# Patient Record
Sex: FEMALE | Race: BLACK OR AFRICAN AMERICAN | NOT HISPANIC OR LATINO | Employment: FULL TIME | URBAN - METROPOLITAN AREA
[De-identification: names, ages, dates, MRNs, and addresses within clinical notes are randomized per-mention and may not be internally consistent; named-entity substitution may affect disease eponyms.]

---

## 2019-06-24 ENCOUNTER — HOSPITAL ENCOUNTER (EMERGENCY)
Facility: HOSPITAL | Age: 34
Discharge: HOME | End: 2019-06-24
Attending: EMERGENCY MEDICINE | Admitting: EMERGENCY MEDICINE
Payer: COMMERCIAL

## 2019-06-24 VITALS
SYSTOLIC BLOOD PRESSURE: 109 MMHG | DIASTOLIC BLOOD PRESSURE: 55 MMHG | TEMPERATURE: 97.7 F | BODY MASS INDEX: 22.78 KG/M2 | OXYGEN SATURATION: 100 % | RESPIRATION RATE: 18 BRPM | HEART RATE: 92 BPM | HEIGHT: 60 IN | WEIGHT: 116 LBS

## 2019-06-24 DIAGNOSIS — R10.9 ABDOMINAL PAIN: Primary | ICD-10-CM

## 2019-06-24 DIAGNOSIS — R19.7 DIARRHEA: ICD-10-CM

## 2019-06-24 DIAGNOSIS — R11.2 NAUSEA AND VOMITING: ICD-10-CM

## 2019-06-24 LAB
EXT PREG TEST URINE: NEGATIVE
EXT. CONTROL ED NAV: NORMAL

## 2019-06-24 PROCEDURE — 99284 EMERGENCY DEPT VISIT MOD MDM: CPT

## 2019-06-24 PROCEDURE — 81025 URINE PREGNANCY TEST: CPT | Performed by: EMERGENCY MEDICINE

## 2019-06-24 RX ORDER — ONDANSETRON 4 MG/1
4 TABLET, ORALLY DISINTEGRATING ORAL ONCE
Status: COMPLETED | OUTPATIENT
Start: 2019-06-24 | End: 2019-06-24

## 2019-06-24 RX ORDER — DICYCLOMINE HYDROCHLORIDE 10 MG/1
20 CAPSULE ORAL ONCE
Status: COMPLETED | OUTPATIENT
Start: 2019-06-24 | End: 2019-06-24

## 2019-06-24 RX ORDER — PANTOPRAZOLE SODIUM 40 MG/1
40 TABLET, DELAYED RELEASE ORAL ONCE
Status: COMPLETED | OUTPATIENT
Start: 2019-06-24 | End: 2019-06-24

## 2019-06-24 RX ORDER — DICYCLOMINE HCL 20 MG
20 TABLET ORAL 2 TIMES DAILY
Qty: 20 TABLET | Refills: 0 | Status: SHIPPED | OUTPATIENT
Start: 2019-06-24 | End: 2019-07-15

## 2019-06-24 RX ORDER — ONDANSETRON 4 MG/1
4 TABLET, FILM COATED ORAL EVERY 6 HOURS
Qty: 9 TABLET | Refills: 0 | Status: SHIPPED | OUTPATIENT
Start: 2019-06-24 | End: 2019-07-15

## 2019-06-24 RX ORDER — PANTOPRAZOLE SODIUM 20 MG/1
20 TABLET, DELAYED RELEASE ORAL DAILY
Qty: 20 TABLET | Refills: 0 | Status: SHIPPED | OUTPATIENT
Start: 2019-06-24 | End: 2019-07-15

## 2019-06-24 RX ADMIN — PANTOPRAZOLE SODIUM 40 MG: 40 TABLET, DELAYED RELEASE ORAL at 13:49

## 2019-06-24 RX ADMIN — ONDANSETRON 4 MG: 4 TABLET, ORALLY DISINTEGRATING ORAL at 13:49

## 2019-06-24 RX ADMIN — DICYCLOMINE HYDROCHLORIDE 20 MG: 10 CAPSULE ORAL at 13:49

## 2019-07-15 ENCOUNTER — APPOINTMENT (EMERGENCY)
Dept: RADIOLOGY | Facility: HOSPITAL | Age: 34
End: 2019-07-15
Payer: COMMERCIAL

## 2019-07-15 ENCOUNTER — HOSPITAL ENCOUNTER (EMERGENCY)
Facility: HOSPITAL | Age: 34
Discharge: HOME | End: 2019-07-15
Attending: EMERGENCY MEDICINE | Admitting: EMERGENCY MEDICINE
Payer: COMMERCIAL

## 2019-07-15 VITALS
DIASTOLIC BLOOD PRESSURE: 56 MMHG | BODY MASS INDEX: 22.39 KG/M2 | WEIGHT: 114.64 LBS | OXYGEN SATURATION: 96 % | RESPIRATION RATE: 16 BRPM | SYSTOLIC BLOOD PRESSURE: 106 MMHG | TEMPERATURE: 98.5 F | HEART RATE: 74 BPM

## 2019-07-15 DIAGNOSIS — K52.9 COLITIS: ICD-10-CM

## 2019-07-15 DIAGNOSIS — R10.9 ABDOMINAL PAIN: Primary | ICD-10-CM

## 2019-07-15 LAB
ALBUMIN SERPL BCP-MCNC: 3.5 G/DL (ref 3.5–5)
ALP SERPL-CCNC: 49 U/L (ref 46–116)
ALT SERPL W P-5'-P-CCNC: 20 U/L (ref 12–78)
ANION GAP SERPL CALCULATED.3IONS-SCNC: 7 MMOL/L (ref 4–13)
AST SERPL W P-5'-P-CCNC: 17 U/L (ref 5–45)
BASOPHILS # BLD AUTO: 0.04 THOUSANDS/ΜL (ref 0–0.1)
BASOPHILS NFR BLD AUTO: 0 % (ref 0–1)
BILIRUB SERPL-MCNC: 0.3 MG/DL (ref 0.2–1)
BILIRUB UR QL STRIP: NEGATIVE
BUN SERPL-MCNC: 12 MG/DL (ref 5–25)
CALCIUM SERPL-MCNC: 8.2 MG/DL (ref 8.3–10.1)
CHLORIDE SERPL-SCNC: 104 MMOL/L (ref 100–108)
CLARITY UR: CLEAR
CO2 SERPL-SCNC: 27 MMOL/L (ref 21–32)
COLOR UR: ABNORMAL
CREAT SERPL-MCNC: 0.69 MG/DL (ref 0.6–1.3)
EOSINOPHIL # BLD AUTO: 0.11 THOUSAND/ΜL (ref 0–0.61)
EOSINOPHIL NFR BLD AUTO: 1 % (ref 0–6)
ERYTHROCYTE [DISTWIDTH] IN BLOOD BY AUTOMATED COUNT: 12.8 % (ref 11.6–15.1)
EXT PREG TEST URINE: NEGATIVE
EXT. CONTROL ED NAV: NORMAL
GFR SERPL CREATININE-BSD FRML MDRD: 130 ML/MIN/1.73SQ M
GLUCOSE SERPL-MCNC: 101 MG/DL (ref 65–140)
GLUCOSE UR STRIP-MCNC: NEGATIVE MG/DL
HCT VFR BLD AUTO: 37.4 % (ref 34.8–46.1)
HGB BLD-MCNC: 12 G/DL (ref 11.5–15.4)
HGB UR QL STRIP.AUTO: NEGATIVE
IMM GRANULOCYTES # BLD AUTO: 0.03 THOUSAND/UL (ref 0–0.2)
IMM GRANULOCYTES NFR BLD AUTO: 0 % (ref 0–2)
KETONES UR STRIP-MCNC: ABNORMAL MG/DL
LEUKOCYTE ESTERASE UR QL STRIP: NEGATIVE
LIPASE SERPL-CCNC: 119 U/L (ref 73–393)
LYMPHOCYTES # BLD AUTO: 2.32 THOUSANDS/ΜL (ref 0.6–4.47)
LYMPHOCYTES NFR BLD AUTO: 23 % (ref 14–44)
MAGNESIUM SERPL-MCNC: 1.8 MG/DL (ref 1.6–2.6)
MCH RBC QN AUTO: 31.3 PG (ref 26.8–34.3)
MCHC RBC AUTO-ENTMCNC: 32.1 G/DL (ref 31.4–37.4)
MCV RBC AUTO: 97 FL (ref 82–98)
MONOCYTES # BLD AUTO: 0.71 THOUSAND/ΜL (ref 0.17–1.22)
MONOCYTES NFR BLD AUTO: 7 % (ref 4–12)
NEUTROPHILS # BLD AUTO: 7.11 THOUSANDS/ΜL (ref 1.85–7.62)
NEUTS SEG NFR BLD AUTO: 69 % (ref 43–75)
NITRITE UR QL STRIP: NEGATIVE
NRBC BLD AUTO-RTO: 0 /100 WBCS
PH UR STRIP.AUTO: 6 [PH]
PLATELET # BLD AUTO: 248 THOUSANDS/UL (ref 149–390)
PMV BLD AUTO: 10.8 FL (ref 8.9–12.7)
POTASSIUM SERPL-SCNC: 4.2 MMOL/L (ref 3.5–5.3)
PROT SERPL-MCNC: 7.4 G/DL (ref 6.4–8.2)
PROT UR STRIP-MCNC: NEGATIVE MG/DL
RBC # BLD AUTO: 3.84 MILLION/UL (ref 3.81–5.12)
SODIUM SERPL-SCNC: 138 MMOL/L (ref 136–145)
SP GR UR STRIP.AUTO: 1.02 (ref 1–1.03)
UROBILINOGEN UR QL STRIP.AUTO: 0.2 E.U./DL
WBC # BLD AUTO: 10.32 THOUSAND/UL (ref 4.31–10.16)

## 2019-07-15 PROCEDURE — 81003 URINALYSIS AUTO W/O SCOPE: CPT | Performed by: EMERGENCY MEDICINE

## 2019-07-15 PROCEDURE — 36415 COLL VENOUS BLD VENIPUNCTURE: CPT | Performed by: EMERGENCY MEDICINE

## 2019-07-15 PROCEDURE — 81025 URINE PREGNANCY TEST: CPT | Performed by: EMERGENCY MEDICINE

## 2019-07-15 PROCEDURE — 85025 COMPLETE CBC W/AUTO DIFF WBC: CPT | Performed by: EMERGENCY MEDICINE

## 2019-07-15 PROCEDURE — 80053 COMPREHEN METABOLIC PANEL: CPT | Performed by: EMERGENCY MEDICINE

## 2019-07-15 PROCEDURE — 99284 EMERGENCY DEPT VISIT MOD MDM: CPT

## 2019-07-15 PROCEDURE — 83735 ASSAY OF MAGNESIUM: CPT | Performed by: EMERGENCY MEDICINE

## 2019-07-15 PROCEDURE — 96374 THER/PROPH/DIAG INJ IV PUSH: CPT

## 2019-07-15 PROCEDURE — 74177 CT ABD & PELVIS W/CONTRAST: CPT

## 2019-07-15 PROCEDURE — 96361 HYDRATE IV INFUSION ADD-ON: CPT

## 2019-07-15 PROCEDURE — 83690 ASSAY OF LIPASE: CPT | Performed by: EMERGENCY MEDICINE

## 2019-07-15 RX ORDER — CIPROFLOXACIN 500 MG/1
500 TABLET, FILM COATED ORAL 2 TIMES DAILY
Qty: 20 TABLET | Refills: 0 | Status: SHIPPED | OUTPATIENT
Start: 2019-07-15 | End: 2019-07-25

## 2019-07-15 RX ORDER — METRONIDAZOLE 500 MG/1
500 TABLET ORAL EVERY 8 HOURS SCHEDULED
Qty: 30 TABLET | Refills: 0 | Status: SHIPPED | OUTPATIENT
Start: 2019-07-15 | End: 2019-07-25

## 2019-07-15 RX ORDER — METRONIDAZOLE 500 MG/1
500 TABLET ORAL ONCE
Status: COMPLETED | OUTPATIENT
Start: 2019-07-15 | End: 2019-07-15

## 2019-07-15 RX ORDER — KETOROLAC TROMETHAMINE 30 MG/ML
15 INJECTION, SOLUTION INTRAMUSCULAR; INTRAVENOUS ONCE
Status: COMPLETED | OUTPATIENT
Start: 2019-07-15 | End: 2019-07-15

## 2019-07-15 RX ORDER — CIPROFLOXACIN 500 MG/1
500 TABLET, FILM COATED ORAL ONCE
Status: COMPLETED | OUTPATIENT
Start: 2019-07-15 | End: 2019-07-15

## 2019-07-15 RX ORDER — MORPHINE SULFATE 4 MG/ML
4 INJECTION, SOLUTION INTRAMUSCULAR; INTRAVENOUS ONCE
Status: DISCONTINUED | OUTPATIENT
Start: 2019-07-15 | End: 2019-07-15

## 2019-07-15 RX ORDER — ONDANSETRON 4 MG/1
4 TABLET, FILM COATED ORAL EVERY 6 HOURS
Qty: 9 TABLET | Refills: 0 | Status: SHIPPED | OUTPATIENT
Start: 2019-07-15 | End: 2021-05-05

## 2019-07-15 RX ORDER — FLUCONAZOLE 100 MG/1
200 TABLET ORAL ONCE
Status: COMPLETED | OUTPATIENT
Start: 2019-07-15 | End: 2019-07-15

## 2019-07-15 RX ADMIN — KETOROLAC TROMETHAMINE 15 MG: 30 INJECTION, SOLUTION INTRAMUSCULAR at 19:39

## 2019-07-15 RX ADMIN — SODIUM CHLORIDE 1000 ML: 0.9 INJECTION, SOLUTION INTRAVENOUS at 19:38

## 2019-07-15 RX ADMIN — CIPROFLOXACIN HYDROCHLORIDE 500 MG: 500 TABLET, FILM COATED ORAL at 21:50

## 2019-07-15 RX ADMIN — METRONIDAZOLE 500 MG: 500 TABLET, FILM COATED ORAL at 21:50

## 2019-07-15 RX ADMIN — FLUCONAZOLE 200 MG: 100 TABLET ORAL at 21:58

## 2019-07-15 RX ADMIN — IOHEXOL 85 ML: 350 INJECTION, SOLUTION INTRAVENOUS at 20:48

## 2019-07-15 NOTE — ED PROVIDER NOTES
History  Chief Complaint   Patient presents with    Abdominal Pain     patient c/o generalized abdominal pain starting about 3 hours ago  describes as pressure     26 y/o female presents with diffuse intermittent lower abdominal pain started about 3 hours ago, worsening, associated with some bloody bowel movements  Admits to nausea, denies vomiting,fevers,chills,dysuria, urgency or frequency  LMP 2 weeks ago  History of tubal ligation, no other abdominal surgeries  The pain is sharp non radiating, currently 6/10 nothing makes it better or worse  History provided by:  Patient   used: No        None       History reviewed  No pertinent past medical history  Past Surgical History:   Procedure Laterality Date    TUBAL LIGATION         History reviewed  No pertinent family history  I have reviewed and agree with the history as documented  Social History     Tobacco Use    Smoking status: Never Smoker    Smokeless tobacco: Never Used   Substance Use Topics    Alcohol use: Yes     Comment: socially    Drug use: Never        Review of Systems   All other systems reviewed and are negative  Physical Exam  Physical Exam   Constitutional: She is oriented to person, place, and time  She appears well-developed and well-nourished  HENT:   Head: Normocephalic and atraumatic  Eyes: Pupils are equal, round, and reactive to light  EOM are normal    Neck: Normal range of motion  Neck supple  Cardiovascular: Normal rate and regular rhythm  Pulmonary/Chest: Effort normal and breath sounds normal    Abdominal: Soft  Bowel sounds are normal  She exhibits no shifting dullness, no distension, no pulsatile liver, no fluid wave, no abdominal bruit, no ascites, no pulsatile midline mass and no mass  There is no hepatosplenomegaly, splenomegaly or hepatomegaly  There is tenderness   There is no rigidity, no rebound, no guarding, no CVA tenderness, no tenderness at McBurney's point and negative Ríos's sign  No hernia  Hernia confirmed negative in the ventral area, confirmed negative in the right inguinal area and confirmed negative in the left inguinal area  Musculoskeletal: Normal range of motion  Neurological: She is alert and oriented to person, place, and time  Skin: Skin is warm and dry  Psychiatric: She has a normal mood and affect  Nursing note and vitals reviewed        Vital Signs  ED Triage Vitals [07/15/19 1838]   Temperature Pulse Respirations Blood Pressure SpO2   98 5 °F (36 9 °C) 78 18 124/77 100 %      Temp Source Heart Rate Source Patient Position - Orthostatic VS BP Location FiO2 (%)   Tympanic Monitor Sitting Right arm --      Pain Score       Worst Possible Pain           Vitals:    07/15/19 1838 07/15/19 2015 07/15/19 2033   BP: 124/77 110/64 106/56   Pulse: 78 83 74   Patient Position - Orthostatic VS: Sitting Lying Lying         Visual Acuity      ED Medications  Medications   sodium chloride 0 9 % bolus 1,000 mL (0 mL Intravenous Stopped 7/15/19 2047)   ketorolac (TORADOL) injection 15 mg (15 mg Intravenous Given 7/15/19 1939)   iohexol (OMNIPAQUE) 350 MG/ML injection (MULTI-DOSE) 85 mL (85 mL Intravenous Given 7/15/19 2048)   ciprofloxacin (CIPRO) tablet 500 mg (500 mg Oral Given 7/15/19 2150)   metroNIDAZOLE (FLAGYL) tablet 500 mg (500 mg Oral Given 7/15/19 2150)       Diagnostic Studies  Results Reviewed     Procedure Component Value Units Date/Time    Lipase [[id number]]  (Normal) Collected:  07/15/19 1937    Lab Status:  Final result Specimen:  Blood from Arm, Right Updated:  07/15/19 2004     Lipase 119 u/L     Comprehensive metabolic panel [[id number]]  (Abnormal) Collected:  07/15/19 1937    Lab Status:  Final result Specimen:  Blood from Arm, Right Updated:  07/15/19 2004     Sodium 138 mmol/L      Potassium 4 2 mmol/L      Chloride 104 mmol/L      CO2 27 mmol/L      ANION GAP 7 mmol/L      BUN 12 mg/dL      Creatinine 0 69 mg/dL      Glucose 101 mg/dL      Calcium 8 2 mg/dL      AST 17 U/L      ALT 20 U/L      Alkaline Phosphatase 49 U/L      Total Protein 7 4 g/dL      Albumin 3 5 g/dL      Total Bilirubin 0 30 mg/dL      eGFR 130 ml/min/1 73sq m     Narrative:       [place] guidelines for Chronic Kidney Disease (CKD):     Stage 1 with normal or high GFR (GFR > 90 mL/min/1 73 square meters)    Stage 2 Mild CKD (GFR = 60-89 mL/min/1 73 square meters)    Stage 3A Moderate CKD (GFR = 45-59 mL/min/1 73 square meters)    Stage 3B Moderate CKD (GFR = 30-44 mL/min/1 73 square meters)    Stage 4 Severe CKD (GFR = 15-29 mL/min/1 73 square meters)    Stage 5 End Stage CKD (GFR <15 mL/min/1 73 square meters)  Note: GFR calculation is accurate only with a steady state creatinine    Magnesium [[id number]]  (Normal) Collected:  07/15/19 1937    Lab Status:  Final result Specimen:  Blood from Arm, Right Updated:  07/15/19 2004     Magnesium 1 8 mg/dL     UA (URINE) with reflex to Microscopic [[id number]]  (Abnormal) Collected:  07/15/19 1941    Lab Status:  Final result Specimen:  Urine, Clean Catch Updated:  07/15/19 1950     Color, UA Light Yellow     Clarity, UA Clear     Specific Gravity, UA 1 025     pH, UA 6 0     Leukocytes, UA Negative     Nitrite, UA Negative     Protein, UA Negative mg/dl      Glucose, UA Negative mg/dl      Ketones, UA Trace mg/dl      Urobilinogen, UA 0 2 E U /dl      Bilirubin, UA Negative     Blood, UA Negative    CBC and differential [[id number]]  (Abnormal) Collected:  07/15/19 1937    Lab Status:  Final result Specimen:  Blood from Arm, Right Updated:  07/15/19 1949     WBC 10 32 Thousand/uL      RBC 3 84 Million/uL      Hemoglobin 12 0 g/dL      Hematocrit 37 4 %      MCV 97 fL      MCH 31 3 pg      MCHC 32 1 g/dL      RDW 12 8 %      MPV 10 8 fL      Platelets 412 Thousands/uL      nRBC 0 /100 WBCs      Neutrophils Relative 69 %      Immat GRANS % 0 %      Lymphocytes Relative 23 %      Monocytes Relative 7 % Eosinophils Relative 1 %      Basophils Relative 0 %      Neutrophils Absolute 7 11 Thousands/µL      Immature Grans Absolute 0 03 Thousand/uL      Lymphocytes Absolute 2 32 Thousands/µL      Monocytes Absolute 0 71 Thousand/µL      Eosinophils Absolute 0 11 Thousand/µL      Basophils Absolute 0 04 Thousands/µL     POCT pregnancy, urine [[id number]]  (Normal) Resulted:  07/15/19 1945    Lab Status:  Final result Updated:  07/15/19 1945     EXT PREG TEST UR (Ref: Negative) negative     Control valid                 CT abdomen pelvis with contrast   Final Result by Carly Ford DO (07/15 2107)      Stool-like material is noted within the terminal ileum which may represent slow transit of bowel  The majority of the large bowel is decompressed  Possible wall thickening involving the redundant transverse colon may represent mild infectious/inflammatory colitis  Small amount of free fluid within the cul-de-sac may be physiologic  Workstation performed: [id number]                    Procedures  Procedures       ED Course                               MDM  Number of Diagnoses or Management Options  Abdominal pain:   Colitis:   Diagnosis management comments: Patient evaluated with labs, UA, imaging  I reviewed the results and discussed them with the patient  Patient discharged with appropriate instructions medications and follow-up  Patient verbalized understanding had no further questions at the time of discharge  Patient had stable vital signs and well-appearing at the time of discharge         Amount and/or Complexity of Data Reviewed  Clinical lab tests: ordered and reviewed  Tests in the radiology section of CPT®: reviewed and ordered  Tests in the medicine section of CPT®: ordered and reviewed    Patient Progress  Patient progress: stable      Disposition  Final diagnoses:   Abdominal pain   Colitis     Time reflects when diagnosis was documented in both MDM as applicable and the Disposition within this note     Time User Action Codes Description Comment    7/15/2019  9:24 PM Anepu, May Leys Add [R10 9] Abdominal pain     7/15/2019  9:24 PM Anepu, May Leys Add [K52 9] Colitis       ED Disposition     ED Disposition Condition Date/Time Comment    Discharge Stable Mon Jul 15, 2019  9:24 PM Davi Fitting discharge to home/self care  Follow-up Information     Follow up With Specialties Details Why Contact Info Additional [address] Emergency Department Emergency Medicine  If symptoms worsen [address] [facility], [place], [place], Maged Beauchamp MD Gastroenterology   [address]  [phone]             Patient's Medications   Discharge Prescriptions    CIPROFLOXACIN (CIPRO) 500 MG TABLET    Take 1 tablet (500 mg total) by mouth 2 (two) times a day for 10 days       Start Date: 7/15/2019 End Date: 7/25/2019       Order Dose: 500 mg       Quantity: 20 tablet    Refills: 0    METRONIDAZOLE (FLAGYL) 500 MG TABLET    Take 1 tablet (500 mg total) by mouth every 8 (eight) hours for 10 days       Start Date: 7/15/2019 End Date: 7/25/2019       Order Dose: 500 mg       Quantity: 30 tablet    Refills: 0    ONDANSETRON (ZOFRAN) 4 MG TABLET    Take 1 tablet (4 mg total) by mouth every 6 (six) hours for 3 days       Start Date: 7/15/2019 End Date: 7/18/2019       Order Dose: 4 mg       Quantity: 9 tablet    Refills: 0     No discharge procedures on file      ED Provider  Electronically Signed by           Ricardo Lance DO  07/15/19 2866

## 2019-08-22 ENCOUNTER — TELEPHONE (OUTPATIENT)
Dept: GASTROENTEROLOGY | Facility: CLINIC | Age: 34
End: 2019-08-22

## 2019-09-10 NOTE — TELEPHONE ENCOUNTER
Please offer 10/8 at 2pm with Dr Butler Friends at [facility]   Please cancel whichever apt patient does not wish to keep

## 2019-09-10 NOTE — TELEPHONE ENCOUNTER
This patient was one that was scheduled originally for Dr Carie Smiley when we had to reorganize her schedule due to her trip  ( he original apt was for 3 pm) Patient needs a 3 pm or after apt due to work commitments  Can we accommodate her another day? Please advise?

## 2019-09-10 NOTE — TELEPHONE ENCOUNTER
New pt sched w/Dr Penny Becker 09/13/18 called wanting to r/s her time to an afternoon appt w/any doctor  Pt states she was told to call back for a later time   Pt can be reached at [phone]

## 2021-04-22 ENCOUNTER — OFFICE VISIT (OUTPATIENT)
Dept: OBGYN CLINIC | Facility: CLINIC | Age: 36
End: 2021-04-22
Payer: COMMERCIAL

## 2021-04-22 VITALS
HEIGHT: 60 IN | BODY MASS INDEX: 22.78 KG/M2 | DIASTOLIC BLOOD PRESSURE: 66 MMHG | SYSTOLIC BLOOD PRESSURE: 110 MMHG | WEIGHT: 116 LBS | TEMPERATURE: 96.6 F

## 2021-04-22 DIAGNOSIS — R63.4 WEIGHT LOSS: Primary | ICD-10-CM

## 2021-04-22 DIAGNOSIS — N94.6 DYSMENORRHEA: ICD-10-CM

## 2021-04-22 PROCEDURE — 99203 OFFICE O/P NEW LOW 30 MIN: CPT | Performed by: NURSE PRACTITIONER

## 2021-04-22 RX ORDER — NORETHINDRONE ACETATE AND ETHINYL ESTRADIOL 1MG-20(21)
1 KIT ORAL DAILY
Qty: 84 TABLET | Refills: 0 | Status: SHIPPED | OUTPATIENT
Start: 2021-04-22 | End: 2021-10-22

## 2021-04-22 NOTE — PROGRESS NOTES
Assessment/Plan:    Weight loss  Rx for TFTs  Will call with results, reviewed with patient if abnormal will have to be managed by PCP or endocrinologist      Dysmenorrhea  Discussed with patient pain in the setting of  normal ultrasounds and laparoscopic surgery may just be dysmenorrhea  Will fill out form for record request from previous GYN  Pain is only occurring with menses and denies any dyspareunia or irregular bleeding  Reviewed with patient prior to tubal she had always been on hormonal contraceptives, mostly Depo Provera injections  The hormonal contraceptives were helping her symptoms and once she had the tubal ligation she no longer used hormonal contraceptives and that is when painful menses started  Discussed options such as 600mg Motrin Q 6 hours as needed for pain, Ponstel, or homronal contraceptives  Reviewed birth control options including OCP, NuvaRing, Patch, Depo provera, Nexplanon  Reviewed when to start  What to do if misses pill  Recommended condom use for STD protection and back up method for at least first month after starting pill or if misses more than 2 pills in the pack  Reviewed common side effects of pill including N/V, HA, Breast Pain, Weight gain, bloating, mood swings  Reassured side effects diminished after first month or two on the pill  Reviewed clotting risk and S/S of PE, DVT, MI, and stroke  Rx for Junel 1/20 Fe sent to pharmacy  RTO 3 months for annual/pill check  Diagnoses and all orders for this visit:    Weight loss  -     T4, free  -     TSH, 3rd generation    Dysmenorrhea  -     norethindrone-ethinyl estradiol (JUNEL FE 1/20) 1-20 MG-MCG per tablet; Take 1 tablet by mouth daily          Subjective:      Patient ID: Dipti Dejesus is a 38 y o  female  Pt presents as a new patient for complaints of pelvic pain   Was scheduled for an annual exam but would like to change to a problem visit and will return for her annual exam      Tubal ligaion in 7/2017    About two years later after tubal she started with bad menstrual cramping, back pain and leg pain  She would get horible pain in her stomach, buttock, and down her legs  Menses monthly, regular  Lasting 3-4 days  Moderate flow menses  Very painful  She went back to see her previous GYN (who also did her tubal ligation) and he did a diagnostic laparoscopy  Done 7/2020  After surgery she was advised everything was fine, and only thing noted was a tilted uterus  She continues to have pain  Prior to tubal menses were monthly, slightly longer but not painful like they are now  Pt was on Depo Provera injection prior to tubal  Stopped a few months prior to surgery  Was always on Depo Provera when she was not pregnant  Denies any issues or side effects from Depo  Outside of pregnancy she has always used a hormonal contraceptive so she is unaware if her menses were always painful or not  Amenorrhea while on Depo  Pelvic ultrasound prior to her surgery 7/2020 was normal per patient recollection          The following portions of the patient's history were reviewed and updated as appropriate: allergies, current medications, past family history, past medical history, past social history, past surgical history and problem list     Review of Systems   Genitourinary: Positive for menstrual problem  All other systems reviewed and are negative  Objective:  /66 (BP Location: Right arm, Patient Position: Sitting, Cuff Size: Standard)   Temp (!) 96 6 °F (35 9 °C) (Temporal)   Ht 5' (1 524 m)   Wt 52 6 kg (116 lb)   LMP 04/17/2021 (Exact Date)   BMI 22 65 kg/m²      Physical Exam  Vitals signs and nursing note reviewed  Constitutional:       Appearance: Normal appearance  She is normal weight  Cardiovascular:      Rate and Rhythm: Normal rate and regular rhythm  Pulses: Normal pulses  Heart sounds: Normal heart sounds     Pulmonary:      Effort: Pulmonary effort is normal       Breath sounds: Normal breath sounds  Abdominal:      General: Abdomen is flat  Bowel sounds are normal       Palpations: Abdomen is soft  Skin:     General: Skin is warm and dry  Neurological:      Mental Status: She is alert and oriented to person, place, and time  Psychiatric:         Mood and Affect: Mood normal          Behavior: Behavior normal          Thought Content:  Thought content normal          Judgment: Judgment normal

## 2021-04-23 PROBLEM — R63.4 WEIGHT LOSS: Status: ACTIVE | Noted: 2021-04-23

## 2021-04-23 PROBLEM — N94.6 DYSMENORRHEA: Status: ACTIVE | Noted: 2021-04-23

## 2021-04-23 NOTE — ASSESSMENT & PLAN NOTE
Discussed with patient pain in the setting of  normal ultrasounds and laparoscopic surgery may just be dysmenorrhea  Will fill out form for record request from previous GYN  Pain is only occurring with menses and denies any dyspareunia or irregular bleeding  Reviewed with patient prior to tubal she had always been on hormonal contraceptives, mostly Depo Provera injections  The hormonal contraceptives were helping her symptoms and once she had the tubal ligation she no longer used hormonal contraceptives and that is when painful menses started  Discussed options such as 600mg Motrin Q 6 hours as needed for pain, Ponstel, or homronal contraceptives  Reviewed birth control options including OCP, NuvaRing, Patch, Depo provera, Nexplanon  Reviewed when to start  What to do if misses pill  Recommended condom use for STD protection and back up method for at least first month after starting pill or if misses more than 2 pills in the pack  Reviewed common side effects of pill including N/V, HA, Breast Pain, Weight gain, bloating, mood swings  Reassured side effects diminished after first month or two on the pill  Reviewed clotting risk and S/S of PE, DVT, MI, and stroke  Rx for Junel 1/20 Fe sent to pharmacy  RTO 3 months for annual/pill check

## 2021-04-23 NOTE — ASSESSMENT & PLAN NOTE
Rx for TFTs  Will call with results, reviewed with patient if abnormal will have to be managed by PCP or endocrinologist

## 2021-05-05 ENCOUNTER — HOSPITAL ENCOUNTER (EMERGENCY)
Facility: HOSPITAL | Age: 36
Discharge: HOME | End: 2021-05-05
Attending: EMERGENCY MEDICINE | Admitting: EMERGENCY MEDICINE
Payer: COMMERCIAL

## 2021-05-05 VITALS
DIASTOLIC BLOOD PRESSURE: 58 MMHG | OXYGEN SATURATION: 100 % | BODY MASS INDEX: 22.85 KG/M2 | HEART RATE: 102 BPM | WEIGHT: 117 LBS | RESPIRATION RATE: 17 BRPM | SYSTOLIC BLOOD PRESSURE: 126 MMHG | TEMPERATURE: 97 F

## 2021-05-05 DIAGNOSIS — Z20.822 PERSON UNDER INVESTIGATION FOR COVID-19: Primary | ICD-10-CM

## 2021-05-05 LAB — SARS-COV-2 RNA RESP QL NAA+PROBE: NEGATIVE

## 2021-05-05 PROCEDURE — 87635 SARS-COV-2 COVID-19 AMP PRB: CPT | Performed by: PHYSICIAN ASSISTANT

## 2021-05-05 PROCEDURE — 99281 EMR DPT VST MAYX REQ PHY/QHP: CPT | Performed by: PHYSICIAN ASSISTANT

## 2021-05-05 PROCEDURE — 99283 EMERGENCY DEPT VISIT LOW MDM: CPT

## 2021-05-05 NOTE — Clinical Note
Sp Mchugh was seen and treated in our emergency department on 5/5/2021  Diagnosis: COVID 19 Testing/Rule Out    Darya Spring  may return to work on return date  She may return on this date: 05/06/2021         If you have any questions or concerns, please don't hesitate to call        Cary Mckenzie RN    ______________________________           _______________          _______________  Hospital Representative                              Date                                Time

## 2021-05-05 NOTE — ED PROVIDER NOTES
History  Chief Complaint   Patient presents with    Sore Throat     patient c/o scratchy throat - states her kids have had cold symptoms and she is concerned she is starting to get sick  39 y/o female presenting with a mild sore throat over the past few days without any other symptoms  Here with her three children who are sick with URI symptoms  Patient would like a covid swab  She relays no concerns at this time  Denies cough, congestion, SOB, chest pain, adbominal pain, N/V/D  Prior to Admission Medications   Prescriptions Last Dose Informant Patient Reported? Taking?   norethindrone-ethinyl estradiol (JUNEL FE 1/20) 1-20 MG-MCG per tablet   No No   Sig: Take 1 tablet by mouth daily      Facility-Administered Medications: None       History reviewed  No pertinent past medical history  Past Surgical History:   Procedure Laterality Date    COLONOSCOPY      TUBAL LIGATION         Family History   Problem Relation Age of Onset    Aneurysm Mother     Stomach cancer Mother     Colon cancer Other      I have reviewed and agree with the history as documented  E-Cigarette/Vaping    E-Cigarette Use Never User      E-Cigarette/Vaping Substances    Nicotine No     THC No     CBD No     Flavoring No     Other No     Unknown No      Social History     Tobacco Use    Smoking status: Never Smoker    Smokeless tobacco: Never Used   Substance Use Topics    Alcohol use: Yes     Comment: socially    Drug use: Never       Review of Systems   Constitutional: Negative  HENT: Positive for sore throat  Eyes: Negative  Respiratory: Negative  Cardiovascular: Negative  Gastrointestinal: Negative  Genitourinary: Negative  Musculoskeletal: Negative  Skin: Negative  Neurological: Negative  All other systems reviewed and are negative  Physical Exam  Physical Exam  Vitals signs and nursing note reviewed  Constitutional:       General: She is not in acute distress  Appearance: She is well-developed  She is not diaphoretic  HENT:      Head: Normocephalic and atraumatic  Right Ear: External ear normal       Left Ear: External ear normal       Nose: Nose normal       Mouth/Throat:      Pharynx: No oropharyngeal exudate  Eyes:      General: No scleral icterus  Right eye: No discharge  Left eye: No discharge  Conjunctiva/sclera: Conjunctivae normal       Pupils: Pupils are equal, round, and reactive to light  Neck:      Musculoskeletal: Normal range of motion and neck supple  Cardiovascular:      Rate and Rhythm: Normal rate and regular rhythm  Pulses: Normal pulses  Heart sounds: Normal heart sounds  No murmur  No friction rub  No gallop  Pulmonary:      Effort: Pulmonary effort is normal  No respiratory distress  Breath sounds: Normal breath sounds  No stridor  No wheezing, rhonchi or rales  Comments: spo2 is 100% indicating adequate oxygenation   Chest:      Chest wall: No tenderness  Abdominal:      General: Bowel sounds are normal  There is no distension  Palpations: Abdomen is soft  There is no mass  Tenderness: There is no abdominal tenderness  There is no guarding or rebound  Hernia: No hernia is present  Lymphadenopathy:      Cervical: No cervical adenopathy  Skin:     General: Skin is warm and dry  Capillary Refill: Capillary refill takes less than 2 seconds  Coloration: Skin is not pale  Findings: No erythema or rash  Neurological:      General: No focal deficit present  Mental Status: She is alert and oriented to person, place, and time  Mental status is at baseline           Vital Signs  ED Triage Vitals [05/05/21 1242]   Temperature Pulse Respirations Blood Pressure SpO2   (!) 97 °F (36 1 °C) 102 17 126/58 100 %      Temp Source Heart Rate Source Patient Position - Orthostatic VS BP Location FiO2 (%)   Tympanic Monitor Sitting Right arm --      Pain Score       -- Vitals:    05/05/21 1242   BP: 126/58   Pulse: 102   Patient Position - Orthostatic VS: Sitting         Visual Acuity      ED Medications  Medications - No data to display    Diagnostic Studies  Results Reviewed     Procedure Component Value Units Date/Time    Novel Coronavirus (Covid-19),PCR [facility] - 24 Hour Routine [[id number]] Collected: 05/05/21 1324    Lab Status: In process Specimen: Nasopharyngeal Swab Updated: 05/05/21 1329                 No orders to display              Procedures  Procedures         ED Course                                           MDM  Number of Diagnoses or Management Options  Person under investigation for COVID-19:   Diagnosis management comments: Will swab for covid  Education given  Patient is informed to return to the emergency department for worsening of symptoms and was given proper education regarding their diagnosis and symptoms  Otherwise the patient is informed to follow up with their primary care doctor for re-evaluation  The patient verbalizes understanding and agrees with above assessment and plan  All questions were answered  Please Note: Fluency Direct voice recognition software may have been used in the creation of this document  Wrong words or sound a like substitutions may have occurred due to the inherent limitations of the voice software             Amount and/or Complexity of Data Reviewed  Clinical lab tests: ordered  Review and summarize past medical records: yes  Independent visualization of images, tracings, or specimens: yes        Disposition  Final diagnoses:   Person under investigation for COVID-19     Time reflects when diagnosis was documented in both MDM as applicable and the Disposition within this note     Time User Action Codes Description Comment    5/5/2021  1:10 PM Anastasia Rosas [Z20 [id number]] Person under investigation for COVID-19       ED Disposition     ED Disposition Condition Date/Time Comment    Discharge Stable Wed May 5, 2021  1:10 PM Frederick Nelson discharge to home/self care  Follow-up Information     Follow up With Specialties Details Why Contact Info Additional [address] Emergency Department Emergency Medicine Go to  If symptoms worsen [address] [place] [address] Emergency Department, [place], [place], [address]          Patient's Medications   Discharge Prescriptions    No medications on file     No discharge procedures on file      PDMP Review     None          ED Provider  Electronically Signed by           Tiffani Nolan PA-C  05/05/21 3726

## 2021-05-05 NOTE — DISCHARGE INSTRUCTIONS

## 2021-05-25 LAB
T4 FREE SERPL-MCNC: 0.83 NG/DL (ref 0.82–1.77)
TSH SERPL DL<=0.005 MIU/L-ACNC: 0.95 UIU/ML (ref 0.45–4.5)

## 2021-07-15 ENCOUNTER — OFFICE VISIT (OUTPATIENT)
Dept: FAMILY MEDICINE CLINIC | Facility: CLINIC | Age: 36
End: 2021-07-15
Payer: COMMERCIAL

## 2021-07-15 VITALS
RESPIRATION RATE: 16 BRPM | OXYGEN SATURATION: 100 % | DIASTOLIC BLOOD PRESSURE: 66 MMHG | HEART RATE: 82 BPM | TEMPERATURE: 98.5 F | HEIGHT: 61 IN | WEIGHT: 116.8 LBS | BODY MASS INDEX: 22.05 KG/M2 | SYSTOLIC BLOOD PRESSURE: 98 MMHG

## 2021-07-15 DIAGNOSIS — G58.8 NEURALGIA OF LEFT PUDENDAL NERVE: ICD-10-CM

## 2021-07-15 DIAGNOSIS — R10.32 LLQ ABDOMINAL PAIN: ICD-10-CM

## 2021-07-15 DIAGNOSIS — Z00.00 PHYSICAL EXAM: Primary | ICD-10-CM

## 2021-07-15 LAB
SL AMB  POCT GLUCOSE, UA: NORMAL
SL AMB LEUKOCYTE ESTERASE,UA: NORMAL
SL AMB POCT BILIRUBIN,UA: NORMAL
SL AMB POCT BLOOD,UA: NORMAL
SL AMB POCT CLARITY,UA: CLEAR
SL AMB POCT COLOR,UA: YELLOW
SL AMB POCT KETONES,UA: NORMAL
SL AMB POCT NITRITE,UA: NORMAL
SL AMB POCT PH,UA: 6
SL AMB POCT SPECIFIC GRAVITY,UA: 1.01
SL AMB POCT URINE PROTEIN: NORMAL
SL AMB POCT UROBILINOGEN: NORMAL

## 2021-07-15 PROCEDURE — 3008F BODY MASS INDEX DOCD: CPT | Performed by: FAMILY MEDICINE

## 2021-07-15 PROCEDURE — 3725F SCREEN DEPRESSION PERFORMED: CPT | Performed by: FAMILY MEDICINE

## 2021-07-15 PROCEDURE — 81002 URINALYSIS NONAUTO W/O SCOPE: CPT | Performed by: FAMILY MEDICINE

## 2021-07-15 PROCEDURE — 99395 PREV VISIT EST AGE 18-39: CPT | Performed by: FAMILY MEDICINE

## 2021-07-15 NOTE — PATIENT INSTRUCTIONS

## 2021-07-15 NOTE — PROGRESS NOTES
[id number]    NAME: Trudy Mendoza  AGE: 39 y o  SEX: female  : 1985     DATE: 7/15/2021     Assessment and Plan:     Problem List Items Addressed This Visit     None      Visit Diagnoses     Physical exam    -  Primary    Relevant Orders    POCT urine dip (Completed)    Lipid panel    Comprehensive metabolic panel    TSH, 3rd generation with Free T4 reflex    CBC and differential    LLQ abdominal pain        Relevant Orders    Ambulatory referral to Gastroenterology    Neuralgia of left pudendal nerve        Relevant Orders    Ambulatory referral to Neurology    Comprehensive metabolic panel    TSH, 3rd generation with Free T4 reflex        Pudendal Nerve pain? Recommend patient see neurologist for full evaluation  Might benefit from gabapentin as needed  Will hold off till full evaluation  Would like the patient to continue to see gastroenterologist even in the setting within normal workup from her previous PCP  Referral given to the patient today  Immunizations and preventive care screenings were discussed with patient today  Appropriate education was printed on patient's after visit summary  No follow-ups on file  Chief Complaint:     Chief Complaint   Patient presents with    Physical Exam     new patient here to establish care and have annual exam    Establish Care     patient also complains of reoccuring abdominal pain which she has had multiple tests    Abdominal Pain      History of Present Illness:     Adult Annual Physical   Patient here for a comprehensive physical exam   Abdominal Pain LLQ travel through to her buttock  2017 tubes were tied  Pelvic US was negative, TSH normal  Birth control not helping was advise that the hormones might be the wrong  GYN endometriosis    This pain comes and goes RANDOM    2021 last event  Colonoscopy-> spasm present that its  Estrogen  Supplements given that she was recently diagnosed with endometriosis and feels like it is helping  Three normal pregnancies with no C-sections  Diet and Physical Activity  · Diet/Nutrition: well balanced diet  · Exercise: no formal exercise  Depression Screening  PHQ-9 Depression Screening    PHQ-9:   Frequency of the following problems over the past two weeks:      Little interest or pleasure in doing things: 0 - not at all  Feeling down, depressed, or hopeless: 0 - not at all  PHQ-2 Score: 0       General Health  · Sleep: sleeps well  · Hearing: normal - bilateral   · Vision: no vision problems  /GYN Health  · Contraceptive method: oral contraceptives  Not taking  · History of STDs?: yes  2005 pregenancy with her daughter     Review of Systems:     Review of Systems   Constitutional: Negative for activity change, appetite change, chills, fatigue and fever  HENT: Negative for congestion  Respiratory: Negative for cough, chest tightness and shortness of breath  Cardiovascular: Negative for chest pain and leg swelling  Gastrointestinal: Positive for abdominal pain  Negative for abdominal distention, constipation, diarrhea, nausea and vomiting  Musculoskeletal: Positive for arthralgias  All other systems reviewed and are negative  Past Medical History:     History reviewed  No pertinent past medical history     Past Surgical History:     Past Surgical History:   Procedure Laterality Date    COLONOSCOPY      TUBAL LIGATION        Social History:     Social History     Socioeconomic History    Marital status: Single     Spouse name: None    Number of children: None    Years of education: None    Highest education level: None   Occupational History    None   Tobacco Use    Smoking status: Never Smoker    Smokeless tobacco: Never Used   Vaping Use    Vaping Use: Never used   Substance and Sexual Activity    Alcohol use: Yes     Comment: socially    Drug use: Never    Sexual activity: Yes Other Topics Concern    None   Social History Narrative    None     Social Determinants of Health     Financial Resource Strain:     Difficulty of Paying Living Expenses:    Food Insecurity:     Worried About Running Out of Food in the Last Year:     [address] in the Last Year:    Transportation Needs:     Lack of Transportation (Medical):  Lack of Transportation (Non-Medical):    Physical Activity:     Days of Exercise per Week:     Minutes of Exercise per Session:    Stress:     Feeling of Stress :    Social Connections:     Frequency of Communication with Friends and Family:     Frequency of Social Gatherings with Friends and Family:     Attends [employer] Services:     Active Member of Clubs or Organizations:     Attends Club or Organization Meetings:     Marital Status:    Intimate Partner Violence:     Fear of Current or Ex-Partner:     Emotionally Abused:     Physically Abused:     Sexually Abused:       Family History:     Family History   Problem Relation Age of Onset    Aneurysm Mother     Stomach cancer Mother     Colon cancer Other       Current Medications:     Current Outpatient Medications   Medication Sig Dispense Refill    norethindrone-ethinyl estradiol (JUNEL FE 1/20) 1-20 MG-MCG per tablet Take 1 tablet by mouth daily (Patient not taking: Reported on 7/15/2021) 84 tablet 0     No current facility-administered medications for this visit  Allergies:     No Known Allergies   Physical Exam:     BP 98/66 (BP Location: Left arm, Patient Position: Sitting, Cuff Size: Standard)   Pulse 82   Temp 98 5 °F (36 9 °C) (Temporal)   Resp 16   Ht 5' 0 5" (1 537 m)   Wt 53 kg (116 lb 12 8 oz)   SpO2 100%   BMI 22 44 kg/m²     Physical Exam  Vitals reviewed  Constitutional:       Appearance: Normal appearance  She is well-developed  HENT:      Head: Normocephalic and atraumatic        Right Ear: Tympanic membrane, ear canal and external ear normal  There is no impacted cerumen  Left Ear: Tympanic membrane, ear canal and external ear normal  There is no impacted cerumen  Nose: Nose normal       Mouth/Throat:      Mouth: Mucous membranes are moist       Pharynx: Oropharynx is clear  Eyes:      Conjunctiva/sclera: Conjunctivae normal       Pupils: Pupils are equal, round, and reactive to light  Cardiovascular:      Rate and Rhythm: Normal rate and regular rhythm  Heart sounds: Normal heart sounds  Pulmonary:      Effort: Pulmonary effort is normal       Breath sounds: Normal breath sounds  Abdominal:      General: Abdomen is flat  Bowel sounds are normal       Palpations: Abdomen is soft  Tenderness: There is no abdominal tenderness (not present today)  Musculoskeletal:         General: Normal range of motion  Cervical back: Normal range of motion and neck supple  Skin:     General: Skin is warm  Capillary Refill: Capillary refill takes less than 2 seconds  Neurological:      General: No focal deficit present  Mental Status: She is alert and oriented to person, place, and time  Mental status is at baseline  Psychiatric:         Mood and Affect: Mood normal          Behavior: Behavior normal          Thought Content:  Thought content normal          Judgment: Judgment normal           Kristian Rocha MD   [address] [facility] [address]

## 2021-07-20 LAB
ALBUMIN SERPL-MCNC: 4.1 G/DL (ref 3.8–4.8)
ALBUMIN/GLOB SERPL: 1.5 {RATIO} (ref 1.2–2.2)
ALP SERPL-CCNC: 45 IU/L (ref 48–121)
ALT SERPL-CCNC: 7 IU/L (ref 0–32)
AST SERPL-CCNC: 13 IU/L (ref 0–40)
BASOPHILS # BLD AUTO: 0 X10E3/UL (ref 0–0.2)
BASOPHILS NFR BLD AUTO: 1 %
BILIRUB SERPL-MCNC: 0.5 MG/DL (ref 0–1.2)
BUN SERPL-MCNC: 9 MG/DL (ref 6–20)
BUN/CREAT SERPL: 13 (ref 9–23)
CALCIUM SERPL-MCNC: 9.3 MG/DL (ref 8.7–10.2)
CHLORIDE SERPL-SCNC: 106 MMOL/L (ref 96–106)
CHOLEST SERPL-MCNC: 152 MG/DL (ref 100–199)
CHOLEST/HDLC SERPL: 2.5 RATIO (ref 0–4.4)
CO2 SERPL-SCNC: 23 MMOL/L (ref 20–29)
CREAT SERPL-MCNC: 0.71 MG/DL (ref 0.57–1)
EOSINOPHIL # BLD AUTO: 0.1 X10E3/UL (ref 0–0.4)
EOSINOPHIL NFR BLD AUTO: 2 %
ERYTHROCYTE [DISTWIDTH] IN BLOOD BY AUTOMATED COUNT: 12.6 % (ref 11.7–15.4)
GLOBULIN SER-MCNC: 2.7 G/DL (ref 1.5–4.5)
GLUCOSE SERPL-MCNC: 101 MG/DL (ref 65–99)
HCT VFR BLD AUTO: 34.4 % (ref 34–46.6)
HDLC SERPL-MCNC: 61 MG/DL
HGB BLD-MCNC: 11.7 G/DL (ref 11.1–15.9)
IMM GRANULOCYTES # BLD: 0 X10E3/UL (ref 0–0.1)
IMM GRANULOCYTES NFR BLD: 0 %
LDLC SERPL CALC-MCNC: 82 MG/DL (ref 0–99)
LYMPHOCYTES # BLD AUTO: 1.9 X10E3/UL (ref 0.7–3.1)
LYMPHOCYTES NFR BLD AUTO: 30 %
MCH RBC QN AUTO: 31.5 PG (ref 26.6–33)
MCHC RBC AUTO-ENTMCNC: 34 G/DL (ref 31.5–35.7)
MCV RBC AUTO: 93 FL (ref 79–97)
MICRODELETION SYND BLD/T FISH: NORMAL
MONOCYTES # BLD AUTO: 0.4 X10E3/UL (ref 0.1–0.9)
MONOCYTES NFR BLD AUTO: 6 %
NEUTROPHILS # BLD AUTO: 3.8 X10E3/UL (ref 1.4–7)
NEUTROPHILS NFR BLD AUTO: 61 %
PLATELET # BLD AUTO: 251 X10E3/UL (ref 150–450)
POTASSIUM SERPL-SCNC: 4.5 MMOL/L (ref 3.5–5.2)
PROT SERPL-MCNC: 6.8 G/DL (ref 6–8.5)
RBC # BLD AUTO: 3.72 X10E6/UL (ref 3.77–5.28)
SL AMB EGFR AFRICAN AMERICAN: 126 ML/MIN/1.73
SL AMB EGFR NON AFRICAN AMERICAN: 109 ML/MIN/1.73
SL AMB VLDL CHOLESTEROL CALC: 9 MG/DL (ref 5–40)
SODIUM SERPL-SCNC: 141 MMOL/L (ref 134–144)
TRIGL SERPL-MCNC: 38 MG/DL (ref 0–149)
TSH SERPL DL<=0.005 MIU/L-ACNC: 0.91 UIU/ML (ref 0.45–4.5)
WBC # BLD AUTO: 6.2 X10E3/UL (ref 3.4–10.8)

## 2021-07-22 ENCOUNTER — TELEPHONE (OUTPATIENT)
Dept: NEUROLOGY | Facility: CLINIC | Age: 36
End: 2021-07-22

## 2021-07-22 ENCOUNTER — ANNUAL EXAM (OUTPATIENT)
Dept: OBGYN CLINIC | Facility: CLINIC | Age: 36
End: 2021-07-22
Payer: COMMERCIAL

## 2021-07-22 VITALS — SYSTOLIC BLOOD PRESSURE: 110 MMHG | WEIGHT: 113 LBS | DIASTOLIC BLOOD PRESSURE: 60 MMHG | BODY MASS INDEX: 21.71 KG/M2

## 2021-07-22 DIAGNOSIS — Z11.3 SCREENING EXAMINATION FOR VENEREAL DISEASE: ICD-10-CM

## 2021-07-22 DIAGNOSIS — Z01.419 ROUTINE GYNECOLOGICAL EXAMINATION: Primary | ICD-10-CM

## 2021-07-22 DIAGNOSIS — N94.10 DYSPAREUNIA, FEMALE: ICD-10-CM

## 2021-07-22 PROCEDURE — 99395 PREV VISIT EST AGE 18-39: CPT | Performed by: NURSE PRACTITIONER

## 2021-07-22 NOTE — TELEPHONE ENCOUNTER
Best contact number for patient:  [phone]  Emergency Contact name and number:  Srinivasa Shen: [phone]  Referring provider and telephone number:  [facility]  Primary Care Provider Name and if affiliated with [facility]:     Reason for Appointment/Dx:Neuralgia of left pudendal nerve    Have you seen and followed up with a pediatric Neurologist for this disease in the past?      No (If yes ok to schedule with Dr Rani Kohler)    Neurology Location patient would like to be seen:    Order received? Yes                                                 Records Received? Yes    Have you ever seen another Neurologist?       No    Insurance Information    Insurance Name:    ID/Policy #:    Secondary Insurance:    ID/Policy#: Workman's Comp/ Accident/ School  Information      Workman's Comp/Accident/School related? No    If yes name of Insurance company:    Claim #:    Date of Injury:    Type of Injury:     Name and Telephone Number:    Notes:                   Appointment date: 10/4/21 @ 9:30am, w/Dr Andrea Lopez in [facility]  Verified address/phone/inusrance-all current  Sent appt details/directions

## 2021-07-22 NOTE — PROGRESS NOTES
Assessment/Plan   Diagnoses and all orders for this visit:    Routine gynecological examination  -     IGP,CtNgTv,Apt HPV,rfx16/18,45    Dyspareunia, female  -     US pelvis complete w transvaginal; Future  -     Ambulatory referral to Physical Therapy; Future    Screening examination for venereal disease  -     IGP,CtNgTv,Apt HPV,rfx16/18,45        Discussion    Reviewed with patient normal exam today  Pap with HPV/GC/CT/Trich done today  Rx for pelvic ultrasound to assess for dyspareunia and bleeding with intercourse  Recommend pelvic floor physical therapy to help with pelvic pain  Continue to follow up with GI and Urology for potential causes of pelvic pain  Normal breast exam today  Monthly SBEs advised  Vitamin D and Calcim Supplements advised  Exercise most days of the week  Follow with PCP for regular check-ups and blood work  RTO 1 year for annual or sooner as needed  Subjective     Alexandria Negron is a 39 y o  female who presents for annual well woman exam    Last exam 2019 Pap   Pap guidelines reviewed with patient  Pt would like Pap today  Pt denies any abnormal vaginal discharge, itching, or odor  Pt in a mutually exclusive relationship () with a male partner and would like STD testing  Menstrual Cycle:  LMP: 7/17/2021  Period Cycle (Days): 28  Period Duration (Days): 3-4  Period Pattern: Regular  Menstrual Flow: Heavy  Menstrual Control: Maxi pad  Menstrual Control Change Freq (Hours): 4  Dysmenorrhea: (!) Severe  Dysmenorrhea Symptoms: Cramping  July 3rd at 7am she had a cramp like feeling in her left side which traveled to her buttocks  She has pain when she walks when it occurs  She lets it ride, will stay for about 20min  Unrelated to bowel or bladder per patient  Bleeding with intercourse, most of the time  OB History     G 3 P 3   Contraception: tubal ligation  Practices monthly SBEs, no breast complaints today  Denies any bowel or bladder issues    Pt follows with PCP for regular check-ups and blood work  Review of Systems   Genitourinary: Positive for dyspareunia and pelvic pain  All other systems reviewed and are negative  The following portions of the patient's history were reviewed and updated as appropriate: allergies, current medications, past family history, past medical history, past social history, past surgical history and problem list     Period Cycle (Days): 28  Period Duration (Days): 3-4  Period Pattern: Regular  Menstrual Flow: Heavy  Menstrual Control: Maxi pad  Menstrual Control Change Freq (Hours): 4  Dysmenorrhea: (!) Severe  Dysmenorrhea Symptoms: Cramping    OB History        3    Para   3    Term   3            AB        Living   3       SAB        TAB        Ectopic        Multiple        Live Births   3                 History reviewed  No pertinent past medical history      Past Surgical History:   Procedure Laterality Date    COLONOSCOPY      TUBAL LIGATION         Family History   Problem Relation Age of Onset    Aneurysm Mother     Stomach cancer Mother     Colon cancer Other        Social History     Socioeconomic History    Marital status: Single     Spouse name: Not on file    Number of children: Not on file    Years of education: Not on file    Highest education level: Not on file   Occupational History    Not on file   Tobacco Use    Smoking status: Never Smoker    Smokeless tobacco: Never Used   Vaping Use    Vaping Use: Never used   Substance and Sexual Activity    Alcohol use: Yes     Comment: socially    Drug use: Never    Sexual activity: Yes     Partners: Male     Birth control/protection: None   Other Topics Concern    Not on file   Social History Narrative    Not on file     Social Determinants of Health     Financial Resource Strain:     Difficulty of Paying Living Expenses:    Food Insecurity:     Worried About Running Out of Food in the Last Year:     [address] in the Last Year: Transportation Needs:     Lack of Transportation (Medical):  Lack of Transportation (Non-Medical):    Physical Activity:     Days of Exercise per Week:     Minutes of Exercise per Session:    Stress:     Feeling of Stress :    Social Connections:     Frequency of Communication with Friends and Family:     Frequency of Social Gatherings with Friends and Family:     Attends Spiritism Services:     Active Member of Clubs or Organizations:     Attends Club or Organization Meetings:     Marital Status:    Intimate Partner Violence:     Fear of Current or Ex-Partner:     Emotionally Abused:     Physically Abused:     Sexually Abused:          Current Outpatient Medications:     norethindrone-ethinyl estradiol ([facility] 1/20) 1-20 MG-MCG per tablet, Take 1 tablet by mouth daily (Patient not taking: Reported on 7/15/2021), Disp: 84 tablet, Rfl: 0    No Known Allergies    Objective   Vitals:    07/22/21 1524   BP: 110/60   BP Location: Right arm   Patient Position: Sitting   Cuff Size: Standard   Weight: 51 3 kg (113 lb)     Physical Exam  Constitutional:       Appearance: She is well-developed  HENT:      Head: Normocephalic  Neck:      Thyroid: No thyromegaly  Trachea: No tracheal deviation  Cardiovascular:      Rate and Rhythm: Normal rate and regular rhythm  Heart sounds: Normal heart sounds  Pulmonary:      Effort: Pulmonary effort is normal       Breath sounds: Normal breath sounds  Chest:      Breasts: Breasts are symmetrical          Right: No inverted nipple, mass, nipple discharge, skin change or tenderness  Left: No inverted nipple, mass, nipple discharge, skin change or tenderness  Abdominal:      General: Bowel sounds are normal  There is no distension  Palpations: Abdomen is soft  There is no mass  Tenderness: There is no abdominal tenderness  There is no guarding or rebound     Genitourinary:     Labia:         Right: No rash, tenderness, lesion or injury  Left: No rash, tenderness, lesion or injury  Vagina: Normal       Cervix: Normal       Uterus: Normal        Adnexa: Right adnexa normal and left adnexa normal         Right: No mass, tenderness or fullness  Left: No mass, tenderness or fullness  Musculoskeletal:         General: Normal range of motion  Cervical back: Normal range of motion and neck supple  Skin:     General: Skin is warm and dry  Neurological:      Mental Status: She is alert and oriented to person, place, and time  Psychiatric:         Behavior: Behavior normal          Thought Content:  Thought content normal          Judgment: Judgment normal

## 2021-07-25 LAB
C TRACH RRNA CVX QL NAA+PROBE: NEGATIVE
CYTOLOGIST CVX/VAG CYTO: NORMAL
DX ICD CODE: NORMAL
HPV I/H RISK 4 DNA CVX QL PROBE+SIG AMP: NEGATIVE
N GONORRHOEA RRNA CVX QL NAA+PROBE: NEGATIVE
OTHER STN SPEC: NORMAL
PATH REPORT.FINAL DX SPEC: NORMAL
SL AMB NOTE:: NORMAL
SL AMB SPECIMEN ADEQUACY: NORMAL
SL AMB TEST METHODOLOGY: NORMAL
T VAGINALIS RRNA SPEC QL NAA+PROBE: NEGATIVE

## 2021-08-02 ENCOUNTER — HOSPITAL ENCOUNTER (EMERGENCY)
Facility: HOSPITAL | Age: 36
Discharge: HOME | End: 2021-08-02
Attending: EMERGENCY MEDICINE
Payer: COMMERCIAL

## 2021-08-02 VITALS
TEMPERATURE: 99.5 F | HEART RATE: 98 BPM | DIASTOLIC BLOOD PRESSURE: 57 MMHG | SYSTOLIC BLOOD PRESSURE: 99 MMHG | RESPIRATION RATE: 20 BRPM | OXYGEN SATURATION: 99 %

## 2021-08-02 DIAGNOSIS — R51.9 HEADACHE: Primary | ICD-10-CM

## 2021-08-02 DIAGNOSIS — M79.10 MYALGIA: ICD-10-CM

## 2021-08-02 LAB — SARS-COV-2 RNA RESP QL NAA+PROBE: POSITIVE

## 2021-08-02 PROCEDURE — 99283 EMERGENCY DEPT VISIT LOW MDM: CPT

## 2021-08-02 PROCEDURE — U0005 INFEC AGEN DETEC AMPLI PROBE: HCPCS | Performed by: EMERGENCY MEDICINE

## 2021-08-02 PROCEDURE — U0003 INFECTIOUS AGENT DETECTION BY NUCLEIC ACID (DNA OR RNA); SEVERE ACUTE RESPIRATORY SYNDROME CORONAVIRUS 2 (SARS-COV-2) (CORONAVIRUS DISEASE [COVID-19]), AMPLIFIED PROBE TECHNIQUE, MAKING USE OF HIGH THROUGHPUT TECHNOLOGIES AS DESCRIBED BY CMS-2020-01-R: HCPCS | Performed by: EMERGENCY MEDICINE

## 2021-08-02 PROCEDURE — 99284 EMERGENCY DEPT VISIT MOD MDM: CPT | Performed by: EMERGENCY MEDICINE

## 2021-08-02 NOTE — Clinical Note
Marcus Vasquez was seen and treated in our emergency department on 8/2/2021  Diagnosis:     Maritza    She may return on this date: To whom it may concern, Pt was seen in this ER and tested positive for COVID on 8/2/21  If you have any questions or concerns, please don't hesitate to call        Nata Dale MD    ______________________________           _______________          _______________  Hospital Representative                              Date                                Time

## 2021-08-02 NOTE — Clinical Note
Ashley Hernandez was seen and treated in our emergency department on 8/2/2021  Diagnosis:     Maritza    She may return on this date: 08/12/2021         If you have any questions or concerns, please don't hesitate to call        Danica Roca MD    ______________________________           _______________          _______________  Hospital Representative                              Date                                Time

## 2021-08-02 NOTE — Clinical Note
Rosetta Min was seen and treated in our emergency department on 8/2/2021  Diagnosis:     Maritza    She may return on this date: 08/12/2021         If you have any questions or concerns, please don't hesitate to call        Zoraida Hankins MD    ______________________________           _______________          _______________  Hospital Representative                              Date                                Time

## 2021-08-02 NOTE — DISCHARGE INSTRUCTIONS

## 2021-08-03 NOTE — ED PROVIDER NOTES
HPI: Patient is a 39 y o  female who presents with 1 days of chills and myalgias which the patient describes at mild The patient has not had contact with people with similar symptoms  The patient has not taken any medication  No Known Allergies    History reviewed  No pertinent past medical history  Past Surgical History:   Procedure Laterality Date    COLONOSCOPY      TUBAL LIGATION       Social History     Tobacco Use    Smoking status: Never Smoker    Smokeless tobacco: Never Used   Vaping Use    Vaping Use: Never used   Substance Use Topics    Alcohol use: Yes     Comment: socially    Drug use: Never       Nursing notes reviewed  Physical Exam:  ED Triage Vitals [08/02/21 1440]   Temperature Pulse Respirations Blood Pressure SpO2   99 5 °F (37 5 °C) 98 20 99/57 99 %      Temp Source Heart Rate Source Patient Position - Orthostatic VS BP Location FiO2 (%)   Tympanic Monitor Sitting Right arm --      Pain Score       --           ROS: Positive for chills, myalgias, the remainder of a 10 organ system ROS was otherwise unremarkable  General: awake, alert, no acute distress    Head: normocephalic, atraumatic    Eyes: no scleral icterus  Ears: external ears normal, hearing grossly intact  Nose: external exam grossly normal, negative nasal discharge  Neck: symmetric, No JVD noted, trachea midline  Pulmonary: no respiratory distress, no tachypnea noted  Cardiovascular: appears well perfused  Abdomen: no distention noted  Musculoskeletal: no deformities noted, tone normal  Neuro: grossly non-focal  Psych: mood and affect appropriate    The patient is stable and has a history and physical exam consistent with a viral illness  COVID19 testing has been performed  I considered the patient's other medical conditions as applicable/noted above in my medical decision making  The patient is stable upon discharge  The plan is for supportive care at home      The patient (and any family present) verbalized understanding of the discharge instructions and warnings that would necessitate return to the Emergency Department  All questions were answered prior to discharge  Medications - No data to display  Final diagnoses:   Headache   Myalgia     Time reflects when diagnosis was documented in both MDM as applicable and the Disposition within this note     Time User Action Codes Description Comment    8/2/2021  6:07 PM Emaline Ing Add [R51 9] Headache     8/2/2021  6:07 PM Emaline Ing Add [M79 10] Myalgia       ED Disposition     ED Disposition Condition Date/Time Comment    Discharge Stable Mon Aug 2, 2021  6:07 PM Ryan Jacobs discharge to home/self care  Follow-up Information    None       Discharge Medication List as of 8/2/2021  6:09 PM      CONTINUE these medications which have NOT CHANGED    Details   norethindrone-ethinyl estradiol (JUNEL FE 1/20) 1-20 MG-MCG per tablet Take 1 tablet by mouth daily, Starting Thu 4/22/2021, Normal           No discharge procedures on file      Electronically Signed by       Yajaira Bella MD  08/02/21 9957

## 2021-08-27 ENCOUNTER — HOSPITAL ENCOUNTER (OUTPATIENT)
Dept: RADIOLOGY | Facility: HOSPITAL | Age: 36
Discharge: HOME | End: 2021-08-27
Payer: COMMERCIAL

## 2021-08-27 DIAGNOSIS — N94.10 DYSPAREUNIA, FEMALE: ICD-10-CM

## 2021-08-27 PROCEDURE — 76830 TRANSVAGINAL US NON-OB: CPT

## 2021-08-27 PROCEDURE — 76856 US EXAM PELVIC COMPLETE: CPT

## 2021-09-03 ENCOUNTER — TELEPHONE (OUTPATIENT)
Dept: OBGYN CLINIC | Facility: CLINIC | Age: 36
End: 2021-09-03

## 2021-09-17 ENCOUNTER — CONSULT (OUTPATIENT)
Dept: GASTROENTEROLOGY | Facility: CLINIC | Age: 36
End: 2021-09-17
Payer: COMMERCIAL

## 2021-09-17 VITALS
SYSTOLIC BLOOD PRESSURE: 113 MMHG | TEMPERATURE: 97.9 F | BODY MASS INDEX: 21.71 KG/M2 | HEART RATE: 90 BPM | WEIGHT: 113 LBS | DIASTOLIC BLOOD PRESSURE: 65 MMHG

## 2021-09-17 DIAGNOSIS — R10.32 LLQ ABDOMINAL PAIN: ICD-10-CM

## 2021-09-17 PROCEDURE — 99204 OFFICE O/P NEW MOD 45 MIN: CPT | Performed by: INTERNAL MEDICINE

## 2021-09-17 PROCEDURE — 1036F TOBACCO NON-USER: CPT | Performed by: INTERNAL MEDICINE

## 2021-09-17 NOTE — PROGRESS NOTES
Consultation - [facility]  Lalito Russell 39 y o  female MRN: [id number]  Unit/Bed#:  Encounter: [id number]        Consults    ASSESSMENT/PLAN:     1  Lower abdominal pain associated with constipation  Likely IBS C versus nerve entrapment syndrome versus less likely malignancy  -  Will treat underlying constipation with Amitiza 8 micro g b i d     Will give samples today, if patient has improvement with this, would recommend prescription  Meanwhile will obtain records of the colonoscopy that was done in December 2019 ( report was visible through Care everywhere, was notable for fair prep with rectal polyp and internal hemorrhoids)  Pathology results are not available  Will need to obtain these   - TSH is normal     -Check magnesium     -Check celiac serologies  -Hemoglobin was normal in 2019     2  Family history of gastric cancer in mother with intermittent epigastric abdominal pain, would recommend EGD for further evaluation  Would also recommend checking celiac serologies  ______________________________________________________________________    Reason for Consult / Principal Problem: [unfilled]    HPI: Lalito Russell is a 39y o  year old female with no significant past medical history, dysmenorrhea, presents for evaluation of suprapubic/ lower abdominal pain radiating to the back and constipation  Hemoglobin most recently was 11 7, normal platelets, normal liver function, normal renal function  she underwent ultrasound recently which was notable for some free fluid  Adjacent to the uterus suggestive of menstruation  Otherwise the study was unremarkable  She also underwent CT scan in July of 2019 which was notable for stool within the TI suggestive of slow transit constipation  She reports undergoing colonoscopy at [facility] in December of 2019 which was of fair prep    She was noted to have a rectal polyp which was biopsied, the results of the pathology are not available to me  There was also evidence of nonbleeding internal hemorrhoids  She does report family history of colon cancer in paternal grandmother in 62s  No unintentional weight loss herself  Patient also reports constipation reports that she has bowel movement every few days  additionally, patient reports that her last episode of suprapubic abdominal pain was in July, has not had this episode since then  Review of Systems: The remainder of the review of systems was negative except for the pertinent positives noted in HPI  Historical Information   History reviewed  No pertinent past medical history  Past Surgical History:   Procedure Laterality Date    COLONOSCOPY      2017/2018    TUBAL LIGATION       Social History   Social History     Substance and Sexual Activity   Alcohol Use Yes    Comment: socially     Social History     Substance and Sexual Activity   Drug Use Never     Social History     Tobacco Use   Smoking Status Never Smoker   Smokeless Tobacco Never Used     Family History   Problem Relation Age of Onset    Aneurysm Mother     Stomach cancer Mother     Colon cancer Other     Colon cancer Paternal Grandmother        Meds/Allergies     (Not in a hospital admission)    No current facility-administered medications for this visit  No Known Allergies    Objective     Blood pressure 113/65, pulse 90, temperature 97 9 °F (36 6 °C), weight 51 3 kg (113 lb), not currently breastfeeding  [unfilled]    PHYSICAL EXAM     GEN: well nourished, well developed, no acute distress  HEENT: anicteric, MMM, no cervical or supraclavicular lymphadenopathy  CV: RRR, no m/r/g  CHEST: CTA b/l, no WRR  ABD: +BS, soft, NT/ND, no hepatosplenomegaly  EXT: no c/c/e  SKIN: no rashes,  NEURO: aaox3    Lab Results:   No visits with results within 1 Day(s) from this visit     Latest known visit with results is:   Admission on 08/02/2021, Discharged on 08/02/2021   Component Date Value    SARS-CoV-2 08/02/2021 Positive*     Imaging Studies: I have personally reviewed pertinent films in PACS                Answers for HPI/ROS submitted by the patient on 9/17/2021  Chronicity: chronic  Onset: more than 1 year ago  Onset quality: gradual  Frequency: intermittently  Episode duration: 20 hours  Progression since onset: unchanged  Pain location: LLQ, generalized abdominal region, left flank  Pain - numeric: 10/10  Pain quality: cramping  Radiates to: pelvis, perineum  anorexia: No  arthralgias: No  belching: No  constipation: No  diarrhea: No  dysuria: No  fever: No  flatus: No  frequency: No  headaches: No  hematochezia: No  hematuria: No  melena: No  myalgias: No  nausea:  No  weight loss: No  vomiting: No  Aggravated by: nothing  Relieved by: recumbency  Diagnostic workup: lower endoscopy, ultrasound

## 2021-09-17 NOTE — LETTER
September 17, 2021     Luiz Church MD  [address]    Patient: Davi Draper   YOB: 1985   Date of Visit: 9/17/2021       Dear Dr Nathan Sloan: Thank you for referring Davi Draper to me for evaluation  Below are my notes for this consultation  If you have questions, please do not hesitate to call me  I look forward to following your patient along with you  Sincerely,        Duran Ku MD        CC: No Recipients  Duran Ku MD  9/17/2021  2:44 PM  Sign when Signing Visit  Consultation - [facility]  Davi Draper 39 y o  female MRN: [id number]  Unit/Bed#:  Encounter: [id number]        Consults    ASSESSMENT/PLAN:     1  Lower abdominal pain associated with constipation  Likely IBS C versus nerve entrapment syndrome versus less likely malignancy  -  Will treat underlying constipation with Amitiza 8 micro g b i d     Will give samples today, if patient has improvement with this, would recommend prescription  Meanwhile will obtain records of the colonoscopy that was done in December 2019 ( report was visible through Care everywhere, was notable for fair prep with rectal polyp and internal hemorrhoids)  Pathology results are not available  Will need to obtain these   - TSH is normal     -Check magnesium     -Check celiac serologies  -Hemoglobin was normal in 2019     2  Family history of gastric cancer in mother with intermittent epigastric abdominal pain, would recommend EGD for further evaluation  Would also recommend checking celiac serologies  ______________________________________________________________________    Reason for Consult / Principal Problem: [unfilled]    HPI: Davi Draper is a 39y o  year old female with no significant past medical history, dysmenorrhea, presents for evaluation of suprapubic/ lower abdominal pain radiating to the back and constipation      Hemoglobin most recently was 11 7, normal platelets, normal liver function, normal renal function  she underwent ultrasound recently which was notable for some free fluid  Adjacent to the uterus suggestive of menstruation  Otherwise the study was unremarkable  She also underwent CT scan in July of 2019 which was notable for stool within the TI suggestive of slow transit constipation  She reports undergoing colonoscopy at [facility] in December of 2019 which was of fair prep  She was noted to have a rectal polyp which was biopsied, the results of the pathology are not available to me  There was also evidence of nonbleeding internal hemorrhoids  She does report family history of colon cancer in paternal grandmother in 62s  No unintentional weight loss herself  Patient also reports constipation reports that she has bowel movement every few days  additionally, patient reports that her last episode of suprapubic abdominal pain was in July, has not had this episode since then  Review of Systems: The remainder of the review of systems was negative except for the pertinent positives noted in HPI  Historical Information   History reviewed  No pertinent past medical history  Past Surgical History:   Procedure Laterality Date    COLONOSCOPY      2017/2018    TUBAL LIGATION       Social History   Social History     Substance and Sexual Activity   Alcohol Use Yes    Comment: socially     Social History     Substance and Sexual Activity   Drug Use Never     Social History     Tobacco Use   Smoking Status Never Smoker   Smokeless Tobacco Never Used     Family History   Problem Relation Age of Onset    Aneurysm Mother     Stomach cancer Mother     Colon cancer Other     Colon cancer Paternal Grandmother        Meds/Allergies     (Not in a hospital admission)    No current facility-administered medications for this visit         No Known Allergies    Objective     Blood pressure 113/65, pulse 90, temperature 97 9 °F (36 6 °C), weight 51 3 kg (113 lb), not currently breastfeeding  [unfilled]    PHYSICAL EXAM     GEN: well nourished, well developed, no acute distress  HEENT: anicteric, MMM, no cervical or supraclavicular lymphadenopathy  CV: RRR, no m/r/g  CHEST: CTA b/l, no WRR  ABD: +BS, soft, NT/ND, no hepatosplenomegaly  EXT: no c/c/e  SKIN: no rashes,  NEURO: aaox3    Lab Results:   No visits with results within 1 Day(s) from this visit  Latest known visit with results is:   Admission on 08/02/2021, Discharged on 08/02/2021   Component Date Value    SARS-CoV-2 08/02/2021 Positive*     Imaging Studies: I have personally reviewed pertinent films in PACS                Answers for HPI/ROS submitted by the patient on 9/17/2021  Chronicity: chronic  Onset: more than 1 year ago  Onset quality: gradual  Frequency: intermittently  Episode duration: 20 hours  Progression since onset: unchanged  Pain location: LLQ, generalized abdominal region, left flank  Pain - numeric: 10/10  Pain quality: cramping  Radiates to: pelvis, perineum  anorexia: No  arthralgias: No  belching: No  constipation: No  diarrhea: No  dysuria: No  fever: No  flatus: No  frequency: No  headaches: No  hematochezia: No  hematuria: No  melena: No  myalgias: No  nausea:  No  weight loss: No  vomiting: No  Aggravated by: nothing  Relieved by: recumbency  Diagnostic workup: lower endoscopy, ultrasound

## 2021-09-20 LAB — MAGNESIUM SERPL-MCNC: 2 MG/DL (ref 1.6–2.3)

## 2021-09-21 LAB
ENDOMYSIUM IGA SER QL: NEGATIVE
GLIADIN PEPTIDE IGA SER-ACNC: 4 UNITS (ref 0–19)
GLIADIN PEPTIDE IGG SER-ACNC: 2 UNITS (ref 0–19)
IGA SERPL-MCNC: 179 MG/DL (ref 87–352)
TTG IGA SER-ACNC: <2 U/ML (ref 0–3)
TTG IGG SER-ACNC: 4 U/ML (ref 0–5)

## 2021-09-24 ENCOUNTER — TELEPHONE (OUTPATIENT)
Dept: GASTROENTEROLOGY | Facility: AMBULARY SURGERY CENTER | Age: 36
End: 2021-09-24

## 2021-09-24 NOTE — TELEPHONE ENCOUNTER
Sent to [place]:  Please can you help? Regarding: Results  ----- Message from SOLE Dunbar sent at 9/24/2021  9:37 AM EDT -----  Pt states she has upcoming EGD/Colonoscopy on 10/28 with Dr Fito Rosen and that somebody was going to look into whether she needed to get COVID testing prior to her procedure  Can somebody follow up with her about that  Thank you!     ----- Message sent from [facility] to [facility] at 9/22/2021  7:53 AM -----   Per Dr Vasquez- Celiac serologies are normal  If you have any questions or concerns please contact our office  Thank you !

## 2021-10-04 ENCOUNTER — CONSULT (OUTPATIENT)
Dept: NEUROLOGY | Facility: CLINIC | Age: 36
End: 2021-10-04
Payer: COMMERCIAL

## 2021-10-04 VITALS
BODY MASS INDEX: 21.34 KG/M2 | TEMPERATURE: 97.7 F | DIASTOLIC BLOOD PRESSURE: 71 MMHG | HEART RATE: 71 BPM | SYSTOLIC BLOOD PRESSURE: 108 MMHG | HEIGHT: 61 IN | WEIGHT: 113 LBS

## 2021-10-04 DIAGNOSIS — R10.30 LOWER ABDOMINAL PAIN: Primary | ICD-10-CM

## 2021-10-04 DIAGNOSIS — G58.8 NEURALGIA OF LEFT PUDENDAL NERVE: ICD-10-CM

## 2021-10-04 DIAGNOSIS — G89.29 PERINEAL PAIN IN FEMALE, CHRONIC: ICD-10-CM

## 2021-10-04 DIAGNOSIS — R10.2 PERINEAL PAIN IN FEMALE, CHRONIC: ICD-10-CM

## 2021-10-04 PROCEDURE — 3008F BODY MASS INDEX DOCD: CPT | Performed by: PSYCHIATRY & NEUROLOGY

## 2021-10-04 PROCEDURE — 99204 OFFICE O/P NEW MOD 45 MIN: CPT | Performed by: PSYCHIATRY & NEUROLOGY

## 2021-10-04 PROCEDURE — 1036F TOBACCO NON-USER: CPT | Performed by: PSYCHIATRY & NEUROLOGY

## 2021-10-20 ENCOUNTER — TELEPHONE (OUTPATIENT)
Dept: GASTROENTEROLOGY | Facility: CLINIC | Age: 36
End: 2021-10-20

## 2021-10-22 ENCOUNTER — TELEPHONE (OUTPATIENT)
Dept: PREADMISSION TESTING | Facility: HOSPITAL | Age: 36
End: 2021-10-22

## 2021-10-22 VITALS — HEIGHT: 61 IN | BODY MASS INDEX: 21.34 KG/M2 | WEIGHT: 113 LBS

## 2021-10-28 ENCOUNTER — ANESTHESIA (OUTPATIENT)
Dept: GASTROENTEROLOGY | Facility: AMBULARY SURGERY CENTER | Age: 36
End: 2021-10-28

## 2021-10-28 ENCOUNTER — HOSPITAL ENCOUNTER (OUTPATIENT)
Dept: GASTROENTEROLOGY | Facility: AMBULARY SURGERY CENTER | Age: 36
Setting detail: OUTPATIENT SURGERY
Discharge: HOME | End: 2021-10-28
Attending: INTERNAL MEDICINE
Payer: COMMERCIAL

## 2021-10-28 ENCOUNTER — ANESTHESIA EVENT (OUTPATIENT)
Dept: GASTROENTEROLOGY | Facility: AMBULARY SURGERY CENTER | Age: 36
End: 2021-10-28

## 2021-10-28 VITALS
WEIGHT: 113 LBS | HEART RATE: 84 BPM | BODY MASS INDEX: 22.19 KG/M2 | TEMPERATURE: 96.2 F | RESPIRATION RATE: 16 BRPM | OXYGEN SATURATION: 100 % | SYSTOLIC BLOOD PRESSURE: 112 MMHG | DIASTOLIC BLOOD PRESSURE: 70 MMHG | HEIGHT: 60 IN

## 2021-10-28 DIAGNOSIS — K29.70 GASTRITIS WITHOUT BLEEDING, UNSPECIFIED CHRONICITY, UNSPECIFIED GASTRITIS TYPE: Primary | ICD-10-CM

## 2021-10-28 DIAGNOSIS — R10.32 LLQ ABDOMINAL PAIN: ICD-10-CM

## 2021-10-28 PROCEDURE — 88305 TISSUE EXAM BY PATHOLOGIST: CPT | Performed by: PATHOLOGY

## 2021-10-28 PROCEDURE — 43239 EGD BIOPSY SINGLE/MULTIPLE: CPT | Performed by: INTERNAL MEDICINE

## 2021-10-28 RX ORDER — PROPOFOL 10 MG/ML
INJECTION, EMULSION INTRAVENOUS CONTINUOUS PRN
Status: DISCONTINUED | OUTPATIENT
Start: 2021-10-28 | End: 2021-10-28

## 2021-10-28 RX ORDER — PANTOPRAZOLE SODIUM 40 MG/1
40 TABLET, DELAYED RELEASE ORAL DAILY
Qty: 90 TABLET | Refills: 0 | Status: SHIPPED | OUTPATIENT
Start: 2021-10-28 | End: 2022-05-10

## 2021-10-28 RX ORDER — ONDANSETRON 2 MG/ML
4 INJECTION INTRAMUSCULAR; INTRAVENOUS ONCE AS NEEDED
Status: CANCELLED | OUTPATIENT
Start: 2021-10-28

## 2021-10-28 RX ORDER — SODIUM CHLORIDE, SODIUM LACTATE, POTASSIUM CHLORIDE, CALCIUM CHLORIDE 600; 310; 30; 20 MG/100ML; MG/100ML; MG/100ML; MG/100ML
125 INJECTION, SOLUTION INTRAVENOUS CONTINUOUS
Status: DISCONTINUED | OUTPATIENT
Start: 2021-10-28 | End: 2021-11-01 | Stop reason: HOSPADM

## 2021-10-28 RX ORDER — SODIUM CHLORIDE, SODIUM LACTATE, POTASSIUM CHLORIDE, CALCIUM CHLORIDE 600; 310; 30; 20 MG/100ML; MG/100ML; MG/100ML; MG/100ML
INJECTION, SOLUTION INTRAVENOUS CONTINUOUS PRN
Status: DISCONTINUED | OUTPATIENT
Start: 2021-10-28 | End: 2021-10-28

## 2021-10-28 RX ORDER — PROPOFOL 10 MG/ML
INJECTION, EMULSION INTRAVENOUS AS NEEDED
Status: DISCONTINUED | OUTPATIENT
Start: 2021-10-28 | End: 2021-10-28

## 2021-10-28 RX ADMIN — PROPOFOL 160 MG: 10 INJECTION, EMULSION INTRAVENOUS at 09:09

## 2021-10-28 RX ADMIN — SODIUM CHLORIDE, SODIUM LACTATE, POTASSIUM CHLORIDE, AND CALCIUM CHLORIDE 125 ML/HR: .6; .31; .03; .02 INJECTION, SOLUTION INTRAVENOUS at 08:14

## 2021-10-28 RX ADMIN — SODIUM CHLORIDE, SODIUM LACTATE, POTASSIUM CHLORIDE, AND CALCIUM CHLORIDE: .6; .31; .03; .02 INJECTION, SOLUTION INTRAVENOUS at 09:05

## 2021-10-28 RX ADMIN — PROPOFOL 150 MCG/KG/MIN: 10 INJECTION, EMULSION INTRAVENOUS at 09:09

## 2021-10-28 RX ADMIN — LIDOCAINE HYDROCHLORIDE 30 MG: 20 INJECTION, SOLUTION INTRAVENOUS at 09:09

## 2022-03-11 ENCOUNTER — OFFICE VISIT (OUTPATIENT)
Dept: OBGYN CLINIC | Facility: CLINIC | Age: 37
End: 2022-03-11
Payer: COMMERCIAL

## 2022-03-11 ENCOUNTER — CLINICAL SUPPORT (OUTPATIENT)
Dept: FAMILY MEDICINE CLINIC | Facility: CLINIC | Age: 37
End: 2022-03-11
Payer: COMMERCIAL

## 2022-03-11 VITALS — DIASTOLIC BLOOD PRESSURE: 62 MMHG | WEIGHT: 111 LBS | SYSTOLIC BLOOD PRESSURE: 104 MMHG | BODY MASS INDEX: 21.68 KG/M2

## 2022-03-11 DIAGNOSIS — B96.89 BACTERIAL VAGINOSIS: Primary | ICD-10-CM

## 2022-03-11 DIAGNOSIS — Z23 NEED FOR TUBERCULOSIS VACCINATION: Primary | ICD-10-CM

## 2022-03-11 DIAGNOSIS — N89.8 VAGINAL ODOR: ICD-10-CM

## 2022-03-11 DIAGNOSIS — N76.0 BACTERIAL VAGINOSIS: Primary | ICD-10-CM

## 2022-03-11 PROCEDURE — 86580 TB INTRADERMAL TEST: CPT

## 2022-03-11 PROCEDURE — 99213 OFFICE O/P EST LOW 20 MIN: CPT | Performed by: STUDENT IN AN ORGANIZED HEALTH CARE EDUCATION/TRAINING PROGRAM

## 2022-03-11 PROCEDURE — 1036F TOBACCO NON-USER: CPT | Performed by: STUDENT IN AN ORGANIZED HEALTH CARE EDUCATION/TRAINING PROGRAM

## 2022-03-11 RX ORDER — METRONIDAZOLE 500 MG/1
500 TABLET ORAL EVERY 12 HOURS SCHEDULED
Qty: 14 TABLET | Refills: 0 | Status: SHIPPED | OUTPATIENT
Start: 2022-03-11 | End: 2022-03-18

## 2022-03-11 NOTE — PROGRESS NOTES
[facility]  GYN Visit  22      Assessment/Plan:  Jerzy Chung is a 58-year-old [address] sexually active female with LMP 2022 BTL for contraception with complaints of vaginal odor- amsels criteria met for bacterial vaginosis and Flagyl 500 mg p o  b i d  for 7 days sent to pharmacy  Reviewed patient should not take this medication with alcohol for disulfiram like reaction  STD testing requested- sent off of general culture and vaginitis panel- will call with results  Return as needed or for annual exam       Subjective:      Patient ID: Mike Acevedo is a 39 y o  female  HPI  Jerzy Chung is a 58-year-old  [address] with an LMP of 2022, with BTL for contraception, presenting with complaints of vaginal odor  Patient reports she has been having a slight fishy vaginal odor for the past week and is concerned she has a bacterial infection  Reports a thin white discharge  Denies vaginal pruritis, dysuria or dyspareunia  She is sexually active with 1 partner but does request STD testing today  Review of Systems   Constitutional: Negative for chills, fatigue and fever  Gastrointestinal: Negative for abdominal pain, nausea and vomiting  Genitourinary: Positive for vaginal discharge  Negative for difficulty urinating, dyspareunia, dysuria, frequency, pelvic pain and vaginal pain  All other systems reviewed and are negative  Objective:    /62 (BP Location: Right arm, Patient Position: Sitting, Cuff Size: Standard)   Wt 50 3 kg (111 lb)   LMP 2022 (Approximate)   BMI 21 68 kg/m²      Physical Exam  Vitals reviewed  Constitutional:       General: She is not in acute distress  Appearance: Normal appearance  She is not ill-appearing or diaphoretic  Abdominal:      General: Abdomen is flat  Palpations: Abdomen is soft  There is no mass  Tenderness: There is no abdominal tenderness  There is no guarding     Genitourinary:     Comments: Vulva appears normal without lesions, urethra is midline, Bartholin's and [place]'s glands are within normal limits, on speculum exam the vagina and cervix appear grossly normal no lesions, there is minimal thin white/gray discharge  On bimanual exam there is an anteverted uterus, mobile, nontender with no adnexal masses or fullness appreciated  On wet mount/KOH there is no trichomonads or hyphae, greater than 20% clue cells noted in visual field  + whiff  Musculoskeletal:      Right lower leg: No edema  Left lower leg: No edema  Skin:     General: Skin is warm and dry  Findings: No rash  Neurological:      Mental Status: She is alert and oriented to person, place, and time     Psychiatric:         Mood and Affect: Mood normal          Behavior: Behavior normal            Floyd Siddiqui MD  03/11/22

## 2022-03-14 ENCOUNTER — CLINICAL SUPPORT (OUTPATIENT)
Dept: FAMILY MEDICINE CLINIC | Facility: CLINIC | Age: 37
End: 2022-03-14

## 2022-03-14 VITALS — TEMPERATURE: 97.7 F

## 2022-03-14 DIAGNOSIS — Z11.1 ENCOUNTER FOR PPD SKIN TEST READING: Primary | ICD-10-CM

## 2022-03-14 PROCEDURE — 99024 POSTOP FOLLOW-UP VISIT: CPT

## 2022-03-15 LAB
A VAGINAE DNA VAG QL NAA+PROBE: ABNORMAL SCORE
BACTERIA GENITAL AEROBE CULT: ABNORMAL
BVAB2 DNA VAG QL NAA+PROBE: ABNORMAL SCORE
C ALBICANS DNA VAG QL NAA+PROBE: NEGATIVE
C GLABRATA DNA VAG QL NAA+PROBE: NEGATIVE
C TRACH RRNA SPEC QL NAA+PROBE: NEGATIVE
Lab: ABNORMAL
MEGA1 DNA VAG QL NAA+PROBE: ABNORMAL SCORE
N GONORRHOEA RRNA SPEC QL NAA+PROBE: NEGATIVE
T VAGINALIS RRNA SPEC QL NAA+PROBE: NEGATIVE

## 2022-05-10 ENCOUNTER — HOSPITAL ENCOUNTER (EMERGENCY)
Facility: HOSPITAL | Age: 37
Discharge: HOME | End: 2022-05-10
Attending: EMERGENCY MEDICINE | Admitting: EMERGENCY MEDICINE
Payer: COMMERCIAL

## 2022-05-10 VITALS
SYSTOLIC BLOOD PRESSURE: 102 MMHG | WEIGHT: 112.8 LBS | OXYGEN SATURATION: 98 % | RESPIRATION RATE: 20 BRPM | HEART RATE: 95 BPM | BODY MASS INDEX: 22.03 KG/M2 | TEMPERATURE: 96 F | DIASTOLIC BLOOD PRESSURE: 57 MMHG

## 2022-05-10 DIAGNOSIS — Z20.822 CLOSE EXPOSURE TO COVID-19 VIRUS: Primary | ICD-10-CM

## 2022-05-10 PROCEDURE — 99283 EMERGENCY DEPT VISIT LOW MDM: CPT

## 2022-05-10 PROCEDURE — 99282 EMERGENCY DEPT VISIT SF MDM: CPT | Performed by: EMERGENCY MEDICINE

## 2022-05-10 PROCEDURE — 87636 SARSCOV2 & INF A&B AMP PRB: CPT | Performed by: EMERGENCY MEDICINE

## 2022-05-11 LAB
FLUAV RNA RESP QL NAA+PROBE: NEGATIVE
FLUBV RNA RESP QL NAA+PROBE: NEGATIVE
SARS-COV-2 RNA RESP QL NAA+PROBE: POSITIVE

## 2022-05-11 NOTE — ED PROVIDER NOTES
History  Chief Complaint   Patient presents with    Biological Exposure      positive for covid today and mother here with family for testing  Cough and headache      38 yo female c/o dry cough and headache that started today  Pt's  tested positive for covid today  Pt  Is not vaccinated  No fever, vomiting, diarrhea  History provided by:  Patient   used: No        None       Past Medical History:   Diagnosis Date    COVID-19 08/02/2021    chills, body aches, headache, loss of taste and smell    Dysmenorrhea     LLQ pain     and cramping       Past Surgical History:   Procedure Laterality Date    COLONOSCOPY      2017/2018    LAPAROSCOPY      exploratory    TUBAL LIGATION  2017       Family History   Problem Relation Age of Onset    Aneurysm Mother     Stomach cancer Mother     Colon cancer Other     No Known Problems Father     Colon cancer Paternal Grandmother     Cancer Paternal Grandmother         colon     I have reviewed and agree with the history as documented  E-Cigarette/Vaping    E-Cigarette Use Current Some Day User      E-Cigarette/Vaping Substances    Nicotine Yes     THC No     CBD No     Flavoring Yes     Other No     Unknown No      Social History     Tobacco Use    Smoking status: Never Smoker    Smokeless tobacco: Never Used    Tobacco comment: flavored vape   Vaping Use    Vaping Use: Some days    Substances: Nicotine, Flavoring   Substance Use Topics    Alcohol use: Yes     Alcohol/week: 2 0 standard drinks     Types: 2 Glasses of wine per week     Comment: socially    Drug use: Never       Review of Systems   Constitutional: Negative for fever  HENT: Negative for congestion  Respiratory: Positive for cough  Gastrointestinal: Negative for diarrhea and vomiting  Neurological: Positive for headaches  All other systems reviewed and are negative  Physical Exam  Physical Exam  Vitals and nursing note reviewed  Constitutional:       General: She is not in acute distress  Appearance: She is not ill-appearing  HENT:      Head: Normocephalic and atraumatic  Eyes:      General:         Right eye: No discharge  Left eye: No discharge  Conjunctiva/sclera: Conjunctivae normal    Cardiovascular:      Rate and Rhythm: Normal rate and regular rhythm  Heart sounds: Normal heart sounds  Pulmonary:      Effort: Pulmonary effort is normal  No respiratory distress  Breath sounds: Normal breath sounds  Musculoskeletal:         General: Normal range of motion  Cervical back: Normal range of motion and neck supple  Lymphadenopathy:      Cervical: No cervical adenopathy  Neurological:      General: No focal deficit present  Mental Status: She is alert and oriented to person, place, and time  Psychiatric:         Mood and Affect: Mood normal          Behavior: Behavior normal          Vital Signs  ED Triage Vitals [05/10/22 2014]   Temperature Pulse Respirations Blood Pressure SpO2   (!) 96 °F (35 6 °C) 95 20 102/57 98 %      Temp Source Heart Rate Source Patient Position - Orthostatic VS BP Location FiO2 (%)   Tympanic Monitor Sitting Left arm --      Pain Score       --           Vitals:    05/10/22 2014   BP: 102/57   Pulse: 95   Patient Position - Orthostatic VS: Sitting         Visual Acuity      ED Medications  Medications - No data to display    Diagnostic Studies  Results Reviewed     Procedure Component Value Units Date/Time    COVID/FLU - 24 hour TAT [[id number]] Collected: 05/10/22 2043    Lab Status: In process Specimen: Nares from Nose Updated: 05/10/22 2056                 No orders to display              Procedures  Procedures         ED Course                                             MDM  Number of Diagnoses or Management Options  Close exposure to COVID-19 virus  Diagnosis management comments:  Will screen for covid and flu, advised symptomatic tx prn       Disposition  Final diagnoses:   Close exposure to COVID-19 virus     Time reflects when diagnosis was documented in both MDM as applicable and the Disposition within this note     Time User Action Codes Description Comment    3/98/7708  3:93 PM Nieves Holder Add [T44 097] Close exposure to COVID-19 virus       ED Disposition     ED Disposition Condition Date/Time Comment    Discharge Stable Tue May 10, 2022  8:37 PM Chitra Goel discharge to home/self care  Follow-up Information    None         Patient's Medications   Discharge Prescriptions    No medications on file       No discharge procedures on file      PDMP Review     None          ED Provider  Electronically Signed by           Maria R Cedeno MD  51/73/74 0268

## 2022-08-04 ENCOUNTER — HOSPITAL ENCOUNTER (EMERGENCY)
Facility: HOSPITAL | Age: 37
Discharge: HOME | End: 2022-08-04
Attending: EMERGENCY MEDICINE
Payer: COMMERCIAL

## 2022-08-04 VITALS
SYSTOLIC BLOOD PRESSURE: 106 MMHG | OXYGEN SATURATION: 97 % | HEART RATE: 76 BPM | TEMPERATURE: 98.9 F | BODY MASS INDEX: 22.19 KG/M2 | RESPIRATION RATE: 18 BRPM | DIASTOLIC BLOOD PRESSURE: 56 MMHG | WEIGHT: 113 LBS | HEIGHT: 60 IN

## 2022-08-04 DIAGNOSIS — N39.0 UTI (URINARY TRACT INFECTION): Primary | ICD-10-CM

## 2022-08-04 DIAGNOSIS — K59.00 CONSTIPATION: ICD-10-CM

## 2022-08-04 LAB
BACTERIA UR QL AUTO: ABNORMAL /HPF
BILIRUB UR QL STRIP: NEGATIVE
CLARITY UR: ABNORMAL
COLOR UR: ABNORMAL
EXT PREG TEST URINE: NEGATIVE
EXT. CONTROL ED NAV: NORMAL
GLUCOSE UR STRIP-MCNC: NEGATIVE MG/DL
HGB UR QL STRIP.AUTO: ABNORMAL
KETONES UR STRIP-MCNC: ABNORMAL MG/DL
LEUKOCYTE ESTERASE UR QL STRIP: NEGATIVE
NITRITE UR QL STRIP: POSITIVE
NON-SQ EPI CELLS URNS QL MICRO: ABNORMAL /HPF
PH UR STRIP.AUTO: 5.5 [PH]
PROT UR STRIP-MCNC: ABNORMAL MG/DL
RBC #/AREA URNS AUTO: ABNORMAL /HPF
SP GR UR STRIP.AUTO: >=1.03 (ref 1–1.03)
UROBILINOGEN UR QL STRIP.AUTO: 0.2 E.U./DL
WBC #/AREA URNS AUTO: ABNORMAL /HPF

## 2022-08-04 PROCEDURE — 81001 URINALYSIS AUTO W/SCOPE: CPT | Performed by: EMERGENCY MEDICINE

## 2022-08-04 PROCEDURE — 99284 EMERGENCY DEPT VISIT MOD MDM: CPT

## 2022-08-04 PROCEDURE — 96372 THER/PROPH/DIAG INJ SC/IM: CPT

## 2022-08-04 PROCEDURE — 99282 EMERGENCY DEPT VISIT SF MDM: CPT | Performed by: EMERGENCY MEDICINE

## 2022-08-04 PROCEDURE — 81025 URINE PREGNANCY TEST: CPT | Performed by: EMERGENCY MEDICINE

## 2022-08-04 RX ORDER — KETOROLAC TROMETHAMINE 30 MG/ML
30 INJECTION, SOLUTION INTRAMUSCULAR; INTRAVENOUS ONCE
Status: COMPLETED | OUTPATIENT
Start: 2022-08-04 | End: 2022-08-04

## 2022-08-04 RX ORDER — CEPHALEXIN 500 MG/1
500 CAPSULE ORAL ONCE
Status: COMPLETED | OUTPATIENT
Start: 2022-08-04 | End: 2022-08-04

## 2022-08-04 RX ORDER — ACETAMINOPHEN 325 MG/1
650 TABLET ORAL ONCE
Status: COMPLETED | OUTPATIENT
Start: 2022-08-04 | End: 2022-08-04

## 2022-08-04 RX ORDER — CEPHALEXIN 500 MG/1
500 CAPSULE ORAL EVERY 6 HOURS SCHEDULED
Qty: 28 CAPSULE | Refills: 0 | Status: SHIPPED | OUTPATIENT
Start: 2022-08-04 | End: 2022-08-11

## 2022-08-04 RX ORDER — POLYETHYLENE GLYCOL 3350 17 G/17G
17 POWDER, FOR SOLUTION ORAL DAILY
Qty: 10 EACH | Refills: 0 | Status: SHIPPED | OUTPATIENT
Start: 2022-08-04 | End: 2022-10-13 | Stop reason: SDUPTHER

## 2022-08-04 RX ADMIN — CEPHALEXIN 500 MG: 500 CAPSULE ORAL at 10:41

## 2022-08-04 RX ADMIN — KETOROLAC TROMETHAMINE 30 MG: 30 INJECTION, SOLUTION INTRAMUSCULAR at 09:59

## 2022-08-04 RX ADMIN — MAGNESIUM HYDROXIDE 30 ML: 400 SUSPENSION ORAL at 08:43

## 2022-08-04 RX ADMIN — ACETAMINOPHEN 650 MG: 325 TABLET, FILM COATED ORAL at 08:51

## 2022-08-04 NOTE — ED PROVIDER NOTES
History  Chief Complaint   Patient presents with    Constipation     Worsening today    Abdominal Pain     HPI    50-year-old female presents with constipation  Patient states she normally does not have regular bowel movements  States she has not gone for a couple of days  Yesterday she had [place] and started having abdominal pain  She thought she would have diarrhea but has been trying multiple times at home with no success  States she has cramping like she has to go but nothing coming out  Denies any urinary symptoms  No nausea or vomiting  States she also got her  So cramping from the periods as well  No fevers or chills no back pain  50-year-old female who presents with constipation    None       Past Medical History:   Diagnosis Date    COVID-19 08/02/2021    chills, body aches, headache, loss of taste and smell    Dysmenorrhea     LLQ pain     and cramping       Past Surgical History:   Procedure Laterality Date    COLONOSCOPY      2017/2018    LAPAROSCOPY      exploratory    TUBAL LIGATION  2017       Family History   Problem Relation Age of Onset    Aneurysm Mother     Stomach cancer Mother     Colon cancer Other     No Known Problems Father     Colon cancer Paternal Grandmother     Cancer Paternal Grandmother         colon     I have reviewed and agree with the history as documented  E-Cigarette/Vaping    E-Cigarette Use Current Some Day User      E-Cigarette/Vaping Substances    Nicotine Yes     THC No     CBD No     Flavoring Yes     Other No     Unknown No      Social History     Tobacco Use    Smoking status: Never Smoker    Smokeless tobacco: Never Used    Tobacco comment: flavored vape   Vaping Use    Vaping Use: Some days    Substances: Nicotine, Flavoring   Substance Use Topics    Alcohol use:  Yes     Alcohol/week: 2 0 standard drinks     Types: 2 Glasses of wine per week     Comment: socially    Drug use: Never       Review of Systems   Constitutional: Negative  Negative for diaphoresis and fever  HENT: Negative  Respiratory: Negative  Negative for cough, shortness of breath and wheezing  Cardiovascular: Negative  Negative for chest pain, palpitations and leg swelling  Gastrointestinal: Positive for abdominal pain and constipation  Negative for abdominal distention, nausea and vomiting  Genitourinary: Negative  Musculoskeletal: Negative  Skin: Negative  Neurological: Negative  Psychiatric/Behavioral: Negative  All other systems reviewed and are negative  Physical Exam  Physical Exam  Vitals and nursing note reviewed  Constitutional:       General: She is not in acute distress  Appearance: She is well-developed  HENT:      Head: Normocephalic and atraumatic  Eyes:      Conjunctiva/sclera: Conjunctivae normal    Cardiovascular:      Rate and Rhythm: Normal rate and regular rhythm  Heart sounds: No murmur heard  Pulmonary:      Effort: Pulmonary effort is normal  No respiratory distress  Breath sounds: Normal breath sounds  Abdominal:      Palpations: Abdomen is soft  Tenderness: There is no abdominal tenderness  Musculoskeletal:      Cervical back: Neck supple  Skin:     General: Skin is warm and dry  Neurological:      Mental Status: She is alert           Vital Signs  ED Triage Vitals [08/04/22 0818]   Temperature Pulse Respirations Blood Pressure SpO2   98 9 °F (37 2 °C) 68 18 137/68 98 %      Temp Source Heart Rate Source Patient Position - Orthostatic VS BP Location FiO2 (%)   Oral Monitor Sitting Left arm --      Pain Score       10 - Worst Possible Pain           Vitals:    08/04/22 0818   BP: 137/68   Pulse: 68   Patient Position - Orthostatic VS: Sitting         Visual Acuity      ED Medications  Medications   magnesium hydroxide (MILK OF MAGNESIA) oral suspension 30 mL (has no administration in time range)       Diagnostic Studies  Results Reviewed     None                 No orders to display              Procedures  Procedures         ED Course  ED Course as of 08/05/22 0706   Thu Aug 04, 2022   0443 Reevalution:  Patient feeling much better  States she did have a good bowel movement  States a little bit of cramping still feels like there is still more stool                                             MDM    Disposition  Final diagnoses:   None     ED Disposition     None      Follow-up Information    None         Patient's Medications    No medications on file       No discharge procedures on file      PDMP Review     None          ED Provider  Electronically Signed by           Lindsey Garcia MD  08/05/22 0581

## 2022-10-13 ENCOUNTER — OFFICE VISIT (OUTPATIENT)
Dept: FAMILY MEDICINE CLINIC | Facility: CLINIC | Age: 37
End: 2022-10-13

## 2022-10-13 VITALS
WEIGHT: 112 LBS | RESPIRATION RATE: 20 BRPM | HEIGHT: 61 IN | BODY MASS INDEX: 21.14 KG/M2 | OXYGEN SATURATION: 99 % | HEART RATE: 73 BPM | SYSTOLIC BLOOD PRESSURE: 97 MMHG | TEMPERATURE: 98.4 F | DIASTOLIC BLOOD PRESSURE: 64 MMHG

## 2022-10-13 DIAGNOSIS — R35.0 URINARY FREQUENCY: Primary | ICD-10-CM

## 2022-10-13 DIAGNOSIS — N89.8 VAGINAL DISCHARGE: ICD-10-CM

## 2022-10-13 DIAGNOSIS — K59.00 CONSTIPATION: ICD-10-CM

## 2022-10-13 LAB
BILIRUB UR QL STRIP: NEGATIVE
CLARITY UR: CLEAR
COLOR UR: NORMAL
GLUCOSE UR STRIP-MCNC: NEGATIVE MG/DL
HGB UR QL STRIP.AUTO: NEGATIVE
KETONES UR STRIP-MCNC: NEGATIVE MG/DL
LEUKOCYTE ESTERASE UR QL STRIP: NEGATIVE
NITRITE UR QL STRIP: NEGATIVE
PH UR STRIP.AUTO: 5.5 [PH]
PROT UR STRIP-MCNC: NEGATIVE MG/DL
SL AMB  POCT GLUCOSE, UA: NEGATIVE
SL AMB LEUKOCYTE ESTERASE,UA: NEGATIVE
SL AMB POCT BILIRUBIN,UA: NEGATIVE
SL AMB POCT BLOOD,UA: ABNORMAL
SL AMB POCT CLARITY,UA: CLEAR
SL AMB POCT COLOR,UA: YELLOW
SL AMB POCT KETONES,UA: NEGATIVE
SL AMB POCT NITRITE,UA: NEGATIVE
SL AMB POCT PH,UA: 5
SL AMB POCT SPECIFIC GRAVITY,UA: 1.02
SL AMB POCT URINE PROTEIN: 15
SL AMB POCT UROBILINOGEN: 0.2
SP GR UR STRIP.AUTO: 1.02 (ref 1–1.03)
UROBILINOGEN UR STRIP-ACNC: <2 MG/DL

## 2022-10-13 PROCEDURE — 87510 GARDNER VAG DNA DIR PROBE: CPT | Performed by: FAMILY MEDICINE

## 2022-10-13 PROCEDURE — 81003 URINALYSIS AUTO W/O SCOPE: CPT | Performed by: FAMILY MEDICINE

## 2022-10-13 PROCEDURE — 81002 URINALYSIS NONAUTO W/O SCOPE: CPT | Performed by: FAMILY MEDICINE

## 2022-10-13 PROCEDURE — 87660 TRICHOMONAS VAGIN DIR PROBE: CPT | Performed by: FAMILY MEDICINE

## 2022-10-13 PROCEDURE — 87480 CANDIDA DNA DIR PROBE: CPT | Performed by: FAMILY MEDICINE

## 2022-10-13 PROCEDURE — 99203 OFFICE O/P NEW LOW 30 MIN: CPT | Performed by: FAMILY MEDICINE

## 2022-10-13 RX ORDER — POLYETHYLENE GLYCOL 3350 17 G/17G
17 POWDER, FOR SOLUTION ORAL DAILY PRN
Qty: 72 EACH | Refills: 0 | Status: SHIPPED | OUTPATIENT
Start: 2022-10-13

## 2022-10-13 RX ORDER — SENNA AND DOCUSATE SODIUM 50; 8.6 MG/1; MG/1
2 TABLET, FILM COATED ORAL DAILY PRN
Qty: 60 TABLET | Refills: 0 | Status: SHIPPED | OUTPATIENT
Start: 2022-10-13

## 2022-10-13 NOTE — PROGRESS NOTES
Name: Lasha Brandon      : 1985      MRN: [id number]  Encounter Provider: Lenny Gonzalez MD  Encounter Date: 10/13/2022   Encounter department: [address] [place] [address]  Urinary frequency  Assessment & Plan:  Patient reports increased urinary frequency started 22, which she attributed to UTI  - Denies abdominal pain, dysuria, hematuria, flank pain, or other urinary symptoms    - Most recently treated for UTI all 22 with short course of Keflex  - exam showed mild suprapubic tenderness with deep palpation  No abdominal tenderness  No guarding or rebound  No CVA tenderness   - POC UA positive for blood but negative for nitrite or leukocytes esterase  - presentation and exam consistent with UTI/cystitis, however POC UA by be affected by recent antibiotics    Plan:  - UA with reflex to micro/culture ordered  - consider starting antibiotics if UA positive for bacteriuria    Orders:  -     POCT urine dip  -     UA w Reflex to Microscopic w Reflex to Culture - Clinic Collect    2  Constipation  Assessment & Plan:  Patient reports chronic constipation with bowel movement every 3 days associated with hard/pellet-like stool and requiring excessive straining     - Patient melena or hematochezia  - exam unremarkable  No generalized abdominal tenderness  No rebound or guarding  Normal active bowel sound  - presentation exam consistent with functional constipation  Low suspicion for SBO at this time  Plan:  - recommend continuing MiraLax PRN  - will add Senna-Docusate PRN for adjunctive therapy  - encourage increased oral hydration  -  of dietary improvement including high-fiber diet  - follow-up 1 week to re-evaluate      Orders:  -     senna-docusate sodium (SENOKOT-S) 8 6-50 mg per tablet; Take 2 tablets by mouth daily as needed for constipation  -     polyethylene glycol (MIRALAX) 17 g packet;  Take 17 g by mouth daily as needed (constipation)    3  Vaginal discharge  Assessment & Plan:  Patient reports foul-smelling vaginal discharge started 10/8/22     - Discharge described as clear ands foul-smelling     - Denies fever, chill, abdominal pain, or purulent or bloody vaginal discharge  - LMP 9/30/22  - Patient is sexually active with 1 male partner who is her  and has H/O tubal ligation in 2017 after her 3rd child  - pelvic exam showed moderate amount of whitish/creamy discharge pooled in posterior fornix  No cervical os discharge  No cervical motion tenderness  No adnexal fullness  - wet prep negative for clue cells, yeast, or motile flagellate  - presentation and exam consistent with BV, however wet prep is indeterminate    Plan:  - AFFIRM ordered  - will follow-up with test results and treat as appropriate    Orders:  -     VAGINOSIS DNA PROBE (AFFIRM)         Follow-up: Return in about 1 week (around 10/20/2022) for constipation  Subjective      Chief Complaint   Patient presents with   • Urinary Tract Infection   • Vaginal Discharge     Clear discharge     79-year-old female new patient presents for initial evaluation of increased urinary frequency, foul-smelling vaginal discharge, it chronic constipation  Patient reports increased urinary frequency started 9/30/22, which she attributed to UTI  Denies dysuria, hematuria, flank pain, or other urinary symptoms  Patient most recently treated for UTI all 8/4/22 with short course of Keflex  Patient also reports foul-smelling vaginal discharge started 10/8/22  Discharge described as clear ands foul-smelling  Denies fever, chill, abdominal pain, or purulent or bloody vaginal discharge  LMP 9/30/22  Patient is sexually active with 1 male partner who is her  and has H/O tubal ligation in 2017 after her 3rd child      Patient also reports chronic constipation with bowel movement every 3 days associated with hard/pellet-like stool and requiring excessive straining  Patient denies any melena or hematochezia  Review of Systems   Constitutional: Negative for chills and fever  HENT: Negative for ear pain, sinus pain, sore throat and trouble swallowing  Eyes: Negative for pain and discharge  Respiratory: Negative for shortness of breath  Cardiovascular: Negative for chest pain, palpitations and leg swelling  Gastrointestinal: Positive for constipation  Negative for abdominal pain, nausea and vomiting  Endocrine: Negative for polydipsia and polyuria  Genitourinary: Positive for frequency and vaginal discharge  Negative for difficulty urinating, dysuria, flank pain, hematuria, pelvic pain, vaginal bleeding and vaginal pain  Musculoskeletal: Negative for arthralgias and myalgias  Skin: Negative for rash and wound  Neurological: Negative for seizures and syncope  Psychiatric/Behavioral: Negative for behavioral problems  Current Outpatient Medications on File Prior to Visit   Medication Sig   • [DISCONTINUED] polyethylene glycol (MIRALAX) 17 g packet Take 17 g by mouth daily (Patient not taking: Reported on 10/13/2022)       Objective     BP 97/64 (BP Location: Right arm, Patient Position: Sitting, Cuff Size: Standard)   Pulse 73   Temp 98 4 °F (36 9 °C) (Temporal)   Resp 20   Ht 5' 1" (1 549 m)   Wt 50 8 kg (112 lb)   SpO2 99%   BMI 21 16 kg/m²     Physical Exam  Vitals and nursing note reviewed  Exam conducted with a chaperone present  Constitutional:       General: She is not in acute distress  Appearance: Normal appearance  She is well-developed and normal weight  She is not ill-appearing, toxic-appearing or diaphoretic  HENT:      Head: Normocephalic and atraumatic  Right Ear: External ear normal       Left Ear: External ear normal       Nose: Nose normal       Mouth/Throat:      Mouth: Mucous membranes are moist    Eyes:      General:         Right eye: No discharge  Left eye: No discharge        Extraocular Movements: Extraocular movements intact  Cardiovascular:      Rate and Rhythm: Normal rate  Pulses: Normal pulses  Pulmonary:      Effort: Pulmonary effort is normal  No respiratory distress  Abdominal:      General: Abdomen is flat  Bowel sounds are normal  There is no distension  Palpations: Abdomen is soft  Tenderness: There is abdominal tenderness in the suprapubic area  There is no right CVA tenderness, left CVA tenderness, guarding or rebound  Hernia: There is no hernia in the left inguinal area or right inguinal area  Genitourinary:     General: Normal vulva  Exam position: Knee-chest position  Pubic Area: No rash  Severiano stage (genital): 4       Labia:         Right: No rash, tenderness, lesion or injury  Left: No rash, tenderness, lesion or injury  Urethra: No urethral pain  Vagina: No signs of injury and foreign body  Vaginal discharge present  No erythema, tenderness, bleeding or lesions  Cervix: No cervical motion tenderness, discharge, lesion, erythema or cervical bleeding  Uterus: Not tender  Adnexa:         Right: No tenderness or fullness  Left: No tenderness or fullness  Musculoskeletal:         General: Normal range of motion  Cervical back: Normal range of motion and neck supple  Skin:     General: Skin is warm  Neurological:      General: No focal deficit present  Mental Status: She is alert  Mental status is at baseline     Psychiatric:         Mood and Affect: Mood normal          Behavior: Behavior normal        Steven Jones MD

## 2022-10-14 NOTE — ASSESSMENT & PLAN NOTE
Patient reports chronic constipation with bowel movement every 3 days associated with hard/pellet-like stool and requiring excessive straining     - Patient melena or hematochezia  - exam unremarkable  No generalized abdominal tenderness  No rebound or guarding  Normal active bowel sound  - presentation exam consistent with functional constipation  Low suspicion for SBO at this time      Plan:  - recommend continuing MiraLax PRN  - will add Senna-Docusate PRN for adjunctive therapy  - encourage increased oral hydration  -  of dietary improvement including high-fiber diet  - follow-up 1 week to re-evaluate

## 2022-10-14 NOTE — ASSESSMENT & PLAN NOTE
Patient reports foul-smelling vaginal discharge started 10/8/22     - Discharge described as clear ands foul-smelling     - Denies fever, chill, abdominal pain, or purulent or bloody vaginal discharge  - LMP 9/30/22  - Patient is sexually active with 1 male partner who is her  and has H/O tubal ligation in 2017 after her 3rd child  - pelvic exam showed moderate amount of whitish/creamy discharge pooled in posterior fornix  No cervical os discharge  No cervical motion tenderness  No adnexal fullness  - wet prep negative for clue cells, yeast, or motile flagellate    - presentation and exam consistent with BV, however wet prep is indeterminate    Plan:  - AFFIRM ordered  - will follow-up with test results and treat as appropriate

## 2022-10-14 NOTE — ASSESSMENT & PLAN NOTE
Patient reports increased urinary frequency started 9/30/22, which she attributed to UTI  - Denies abdominal pain, dysuria, hematuria, flank pain, or other urinary symptoms    - Most recently treated for UTI all 8/4/22 with short course of Keflex  - exam showed mild suprapubic tenderness with deep palpation  No abdominal tenderness  No guarding or rebound    No CVA tenderness   - POC UA positive for blood but negative for nitrite or leukocytes esterase  - presentation and exam consistent with UTI/cystitis, however POC UA by be affected by recent antibiotics    Plan:  - UA with reflex to micro/culture ordered  - consider starting antibiotics if UA positive for bacteriuria

## 2022-10-15 LAB
CANDIDA RRNA VAG QL PROBE: NEGATIVE
G VAGINALIS RRNA GENITAL QL PROBE: POSITIVE
T VAGINALIS RRNA GENITAL QL PROBE: NEGATIVE

## 2022-10-16 DIAGNOSIS — N76.0 BACTERIAL VAGINOSIS: Primary | ICD-10-CM

## 2022-10-16 DIAGNOSIS — B96.89 BACTERIAL VAGINOSIS: Primary | ICD-10-CM

## 2022-10-16 RX ORDER — METRONIDAZOLE 500 MG/1
500 TABLET ORAL EVERY 8 HOURS SCHEDULED
Qty: 21 TABLET | Refills: 0 | Status: SHIPPED | OUTPATIENT
Start: 2022-10-16 | End: 2022-10-23

## 2022-10-17 ENCOUNTER — TELEPHONE (OUTPATIENT)
Dept: FAMILY MEDICINE CLINIC | Facility: CLINIC | Age: 37
End: 2022-10-17

## 2022-10-17 NOTE — TELEPHONE ENCOUNTER
Review with patient regarding recent labs  Affirm test positive for Gardnerella vaginalis, which is consistent with pelvic exam on 10/13/22  Patient has received her Flagyl prescription and has been taking as directed  Constipation has also improved with MiraLax and senna combination  Patient will have follow-up appointment on 10/20/22  Patient expresses understanding without any other questions or concerns this time

## 2023-01-26 ENCOUNTER — HOSPITAL ENCOUNTER (EMERGENCY)
Facility: HOSPITAL | Age: 38
Discharge: HOME | End: 2023-01-27
Attending: EMERGENCY MEDICINE

## 2023-01-26 VITALS
SYSTOLIC BLOOD PRESSURE: 106 MMHG | RESPIRATION RATE: 20 BRPM | HEART RATE: 94 BPM | OXYGEN SATURATION: 100 % | DIASTOLIC BLOOD PRESSURE: 57 MMHG | TEMPERATURE: 98.3 F

## 2023-01-26 DIAGNOSIS — R11.2 NAUSEA, VOMITING, AND DIARRHEA: ICD-10-CM

## 2023-01-26 DIAGNOSIS — R19.7 NAUSEA, VOMITING, AND DIARRHEA: ICD-10-CM

## 2023-01-26 LAB
ALBUMIN SERPL BCP-MCNC: 4.3 G/DL (ref 3.5–5)
ALP SERPL-CCNC: 46 U/L (ref 34–104)
ALT SERPL W P-5'-P-CCNC: 7 U/L (ref 7–52)
ANION GAP SERPL CALCULATED.3IONS-SCNC: 9 MMOL/L (ref 4–13)
AST SERPL W P-5'-P-CCNC: 13 U/L (ref 13–39)
BASOPHILS # BLD AUTO: 0.02 THOUSANDS/ÂΜL (ref 0–0.1)
BASOPHILS NFR BLD AUTO: 0 % (ref 0–1)
BILIRUB SERPL-MCNC: 0.77 MG/DL (ref 0.2–1)
BUN SERPL-MCNC: 11 MG/DL (ref 5–25)
CALCIUM SERPL-MCNC: 9.3 MG/DL (ref 8.4–10.2)
CHLORIDE SERPL-SCNC: 105 MMOL/L (ref 96–108)
CO2 SERPL-SCNC: 24 MMOL/L (ref 21–32)
CREAT SERPL-MCNC: 0.73 MG/DL (ref 0.6–1.3)
EOSINOPHIL # BLD AUTO: 0.02 THOUSAND/ÂΜL (ref 0–0.61)
EOSINOPHIL NFR BLD AUTO: 0 % (ref 0–6)
ERYTHROCYTE [DISTWIDTH] IN BLOOD BY AUTOMATED COUNT: 13.1 % (ref 11.6–15.1)
FLUAV RNA RESP QL NAA+PROBE: NEGATIVE
FLUBV RNA RESP QL NAA+PROBE: NEGATIVE
GFR SERPL CREATININE-BSD FRML MDRD: 104 ML/MIN/1.73SQ M
GLUCOSE SERPL-MCNC: 97 MG/DL (ref 65–140)
HCG SERPL QL: NEGATIVE
HCT VFR BLD AUTO: 40.3 % (ref 34.8–46.1)
HGB BLD-MCNC: 13.6 G/DL (ref 11.5–15.4)
IMM GRANULOCYTES # BLD AUTO: 0.04 THOUSAND/UL (ref 0–0.2)
IMM GRANULOCYTES NFR BLD AUTO: 0 % (ref 0–2)
LIPASE SERPL-CCNC: 18 U/L (ref 11–82)
LYMPHOCYTES # BLD AUTO: 0.54 THOUSANDS/ÂΜL (ref 0.6–4.47)
LYMPHOCYTES NFR BLD AUTO: 5 % (ref 14–44)
MAGNESIUM SERPL-MCNC: 1.9 MG/DL (ref 1.9–2.7)
MCH RBC QN AUTO: 31.3 PG (ref 26.8–34.3)
MCHC RBC AUTO-ENTMCNC: 33.7 G/DL (ref 31.4–37.4)
MCV RBC AUTO: 93 FL (ref 82–98)
MONOCYTES # BLD AUTO: 0.46 THOUSAND/ÂΜL (ref 0.17–1.22)
MONOCYTES NFR BLD AUTO: 4 % (ref 4–12)
NEUTROPHILS # BLD AUTO: 9.61 THOUSANDS/ÂΜL (ref 1.85–7.62)
NEUTS SEG NFR BLD AUTO: 91 % (ref 43–75)
NRBC BLD AUTO-RTO: 0 /100 WBCS
PLATELET # BLD AUTO: 257 THOUSANDS/UL (ref 149–390)
PMV BLD AUTO: 10.6 FL (ref 8.9–12.7)
POTASSIUM SERPL-SCNC: 3.9 MMOL/L (ref 3.5–5.3)
PROT SERPL-MCNC: 7.9 G/DL (ref 6.4–8.4)
RBC # BLD AUTO: 4.35 MILLION/UL (ref 3.81–5.12)
RSV RNA RESP QL NAA+PROBE: NEGATIVE
SARS-COV-2 RNA RESP QL NAA+PROBE: NEGATIVE
SODIUM SERPL-SCNC: 138 MMOL/L (ref 135–147)
WBC # BLD AUTO: 10.69 THOUSAND/UL (ref 4.31–10.16)

## 2023-01-26 RX ORDER — LOPERAMIDE HYDROCHLORIDE 2 MG/1
4 CAPSULE ORAL ONCE
Status: COMPLETED | OUTPATIENT
Start: 2023-01-26 | End: 2023-01-26

## 2023-01-26 RX ORDER — ONDANSETRON 4 MG/1
4 TABLET, ORALLY DISINTEGRATING ORAL EVERY 8 HOURS PRN
Qty: 5 TABLET | Refills: 0 | Status: SHIPPED | OUTPATIENT
Start: 2023-01-27 | End: 2023-01-30

## 2023-01-26 RX ORDER — KETOROLAC TROMETHAMINE 30 MG/ML
15 INJECTION, SOLUTION INTRAMUSCULAR; INTRAVENOUS ONCE
Status: COMPLETED | OUTPATIENT
Start: 2023-01-26 | End: 2023-01-26

## 2023-01-26 RX ORDER — ONDANSETRON 4 MG/1
4 TABLET, ORALLY DISINTEGRATING ORAL EVERY 8 HOURS PRN
Qty: 5 TABLET | Refills: 0 | Status: SHIPPED | OUTPATIENT
Start: 2023-01-26 | End: 2023-01-26 | Stop reason: ALTCHOICE

## 2023-01-26 RX ORDER — ONDANSETRON 2 MG/ML
4 INJECTION INTRAMUSCULAR; INTRAVENOUS ONCE
Status: COMPLETED | OUTPATIENT
Start: 2023-01-26 | End: 2023-01-26

## 2023-01-26 RX ADMIN — ONDANSETRON 4 MG: 2 INJECTION INTRAMUSCULAR; INTRAVENOUS at 23:19

## 2023-01-26 RX ADMIN — SODIUM CHLORIDE 1000 ML: 0.9 INJECTION, SOLUTION INTRAVENOUS at 23:16

## 2023-01-26 RX ADMIN — LOPERAMIDE HYDROCHLORIDE 4 MG: 2 CAPSULE ORAL at 23:06

## 2023-01-26 RX ADMIN — KETOROLAC TROMETHAMINE 15 MG: 30 INJECTION, SOLUTION INTRAMUSCULAR at 23:21

## 2023-01-26 NOTE — Clinical Note
Josseline Myers was seen and treated in our emergency department on 1/26/2023  Diagnosis:     Estevan Joy  may return to work on return date  She may return on this date: 01/30/2023         If you have any questions or concerns, please don't hesitate to call        Mary Tirado MD    ______________________________           _______________          _______________  Hospital Representative                              Date                                Time

## 2023-01-27 NOTE — ED PROVIDER NOTES
History  Chief Complaint   Patient presents with   • Vomiting     Patient presents with complaint of one day of vomiting and diarrhea  Patient states her  who she lives with has similar symptoms plus weakness and body aches  Patient is a 65-year-old female seen in the emergency department with concern for nausea, vomiting, diarrhea over the past several hours  Patient states that her  is also recently been ill  Patient notes no new or unusual foods, but states that she did feel somewhat ill after eating a quesadilla from [facility]  Patient also notes some generalized abdominal discomfort  Patient notes no fever, chest pain, shortness of breath  Patient notes 1 episode of vomiting and multiple episodes of diarrhea  Patient states that she did not take any medication for symptom control this evening prior to evaluation in the emergency department  Prior to Admission Medications   Prescriptions Last Dose Informant Patient Reported? Taking?   polyethylene glycol (MIRALAX) 17 g packet   No No   Sig: Take 17 g by mouth daily as needed (constipation)   senna-docusate sodium (SENOKOT-S) 8 6-50 mg per tablet   No No   Sig: Take 2 tablets by mouth daily as needed for constipation      Facility-Administered Medications: None       Past Medical History:   Diagnosis Date   • COVID-19 08/02/2021    chills, body aches, headache, loss of taste and smell   • Dysmenorrhea    • LLQ pain     and cramping       Past Surgical History:   Procedure Laterality Date   • COLONOSCOPY      2017/2018   • LAPAROSCOPY      exploratory   • TUBAL LIGATION  2017       Family History   Problem Relation Age of Onset   • Aneurysm Mother    • Stomach cancer Mother    • Colon cancer Other    • No Known Problems Father    • Colon cancer Paternal Grandmother    • Cancer Paternal Grandmother         colon     I have reviewed and agree with the history as documented      E-Cigarette/Vaping   • E-Cigarette Use Current Some Day User E-Cigarette/Vaping Substances   • Nicotine Yes    • THC No    • CBD No    • Flavoring Yes    • Other No    • Unknown No      Social History     Tobacco Use   • Smoking status: Never   • Smokeless tobacco: Never   • Tobacco comments:     flavored vape   Vaping Use   • Vaping Use: Some days   • Substances: Nicotine, Flavoring   Substance Use Topics   • Alcohol use: Yes     Alcohol/week: 2 0 standard drinks     Types: 2 Glasses of wine per week     Comment: socially   • Drug use: Never       Review of Systems   Constitutional: Negative for fever and unexpected weight change  HENT: Negative for drooling and trouble swallowing  Eyes: Negative for pain and visual disturbance  Respiratory: Negative for cough and shortness of breath  Cardiovascular: Negative for chest pain and palpitations  Gastrointestinal: Positive for abdominal pain, diarrhea, nausea and vomiting  Genitourinary: Negative for decreased urine volume and difficulty urinating  Musculoskeletal: Negative for gait problem and neck stiffness  Skin: Negative for color change and rash  Neurological: Negative for seizures and syncope  Psychiatric/Behavioral: Negative for agitation and confusion  All other systems reviewed and are negative  Physical Exam  Physical Exam  Vitals and nursing note reviewed  Constitutional:       General: She is not in acute distress  Appearance: She is well-developed  HENT:      Head: Normocephalic and atraumatic  Right Ear: External ear normal       Left Ear: External ear normal       Nose: Nose normal       Mouth/Throat:      Pharynx: Oropharynx is clear  Eyes:      General: No scleral icterus  Conjunctiva/sclera: Conjunctivae normal    Cardiovascular:      Rate and Rhythm: Normal rate and regular rhythm  Heart sounds: No murmur heard  Pulmonary:      Effort: Pulmonary effort is normal  No respiratory distress  Breath sounds: Normal breath sounds     Abdominal: General: There is no distension  Palpations: Abdomen is soft  Tenderness: There is no abdominal tenderness  Musculoskeletal:         General: No deformity or signs of injury  Cervical back: Normal range of motion and neck supple  Skin:     General: Skin is warm and dry  Neurological:      General: No focal deficit present  Mental Status: She is alert  Cranial Nerves: No cranial nerve deficit  Sensory: No sensory deficit  Psychiatric:         Mood and Affect: Mood normal          Thought Content:  Thought content normal          Vital Signs  ED Triage Vitals   Temperature Pulse Respirations Blood Pressure SpO2   01/26/23 2234 01/26/23 2234 01/26/23 2234 01/26/23 2234 01/26/23 2234   98 3 °F (36 8 °C) 94 20 106/57 100 %      Temp Source Heart Rate Source Patient Position - Orthostatic VS BP Location FiO2 (%)   01/26/23 2234 01/26/23 2234 01/26/23 2234 01/26/23 2234 --   Oral Monitor Lying Left arm       Pain Score       01/26/23 2321       4           Vitals:    01/26/23 2234   BP: 106/57   Pulse: 94   Patient Position - Orthostatic VS: Lying         Visual Acuity      ED Medications  Medications   sodium chloride 0 9 % bolus 1,000 mL (1,000 mL Intravenous New Bag 1/26/23 2316)   ondansetron (ZOFRAN) injection 4 mg (4 mg Intravenous Given 1/26/23 2319)   loperamide (IMODIUM) capsule 4 mg (4 mg Oral Given 1/26/23 2306)   ketorolac (TORADOL) injection 15 mg (15 mg Intravenous Given 1/26/23 2321)       Diagnostic Studies  Results Reviewed     Procedure Component Value Units Date/Time    hCG, qualitative pregnancy [[id number]]  (Normal) Collected: 01/26/23 2317    Lab Status: Final result Specimen: Blood from Arm, Left Updated: 01/26/23 2353     Preg, Serum Negative    Comprehensive metabolic panel [[id number]] Collected: 01/26/23 2317    Lab Status: Final result Specimen: Blood from Arm, Left Updated: 01/26/23 2349     Sodium 138 mmol/L      Potassium 3 9 mmol/L      Chloride 105 mmol/L CO2 24 mmol/L      ANION GAP 9 mmol/L      BUN 11 mg/dL      Creatinine 0 73 mg/dL      Glucose 97 mg/dL      Calcium 9 3 mg/dL      AST 13 U/L      ALT 7 U/L      Alkaline Phosphatase 46 U/L      Total Protein 7 9 g/dL      Albumin 4 3 g/dL      Total Bilirubin 0 77 mg/dL      eGFR 104 ml/min/1 73sq m     Narrative:      [facility] guidelines for Chronic Kidney Disease (CKD):   •  Stage 1 with normal or high GFR (GFR > 90 mL/min/1 73 square meters)  •  Stage 2 Mild CKD (GFR = 60-89 mL/min/1 73 square meters)  •  Stage 3A Moderate CKD (GFR = 45-59 mL/min/1 73 square meters)  •  Stage 3B Moderate CKD (GFR = 30-44 mL/min/1 73 square meters)  •  Stage 4 Severe CKD (GFR = 15-29 mL/min/1 73 square meters)  •  Stage 5 End Stage CKD (GFR <15 mL/min/1 73 square meters)  Note: GFR calculation is accurate only with a steady state creatinine    Magnesium [[id number]]  (Normal) Collected: 01/26/23 2317    Lab Status: Final result Specimen: Blood from Arm, Left Updated: 01/26/23 2349     Magnesium 1 9 mg/dL     Lipase [[id number]]  (Normal) Collected: 01/26/23 2317    Lab Status: Final result Specimen: Blood from Arm, Left Updated: 01/26/23 2349     Lipase 18 u/L     COVID19, Influenza A/B, RSV PCR, [facility] [[id number]]  (Normal) Collected: 01/26/23 2303    Lab Status: Final result Specimen: Nares from Nose Updated: 01/26/23 2346     SARS-CoV-2 Negative     INFLUENZA A PCR Negative     INFLUENZA B PCR Negative     RSV PCR Negative    Narrative:      FOR PEDIATRIC PATIENTS - copy/paste COVID Guidelines URL to browser: [email or web address]    SARS-CoV-2 assay is a Nucleic Acid Amplification assay intended for the  qualitative detection of nucleic acid from SARS-CoV-2 in nasopharyngeal  swabs  Results are for the presumptive identification of SARS-CoV-2 RNA      Positive results are indicative of infection with SARS-CoV-2, the virus  causing COVID-19, but do not rule out bacterial infection or co-infection  with other viruses  Laboratories within the [facility] and its  territories are required to report all positive results to the appropriate  public health authorities  Negative results do not preclude SARS-CoV-2  infection and should not be used as the sole basis for treatment or other  patient management decisions  Negative results must be combined with  clinical observations, patient history, and epidemiological information  This test has not been FDA cleared or approved  This test has been authorized by FDA under an Emergency Use Authorization  (EUA)  This test is only authorized for the duration of time the  declaration that circumstances exist justifying the authorization of the  emergency use of an in vitro diagnostic tests for detection of SARS-CoV-2  virus and/or diagnosis of COVID-19 infection under section 564(b)(1) of  the Act, 21 U  S C  673IWM-4(V)(0), unless the authorization is terminated  or revoked sooner  The test has been validated but independent review by FDA  and CLIA is pending  Test performed using [employer] Gene[employer]pert: This RT-PCR assay targets N2,  a region unique to SARS-CoV-2  A conserved region in the E-gene was chosen  for pan-Sarbecovirus detection which includes SARS-CoV-2  According to CMS-2020-01-R, this platform meets the definition of high-throughput technology      CBC and differential [[id number]]  (Abnormal) Collected: 01/26/23 2317    Lab Status: Final result Specimen: Blood from Arm, Left Updated: 01/26/23 6050     WBC 10 69 Thousand/uL      RBC 4 35 Million/uL      Hemoglobin 13 6 g/dL      Hematocrit 40 3 %      MCV 93 fL      MCH 31 3 pg      MCHC 33 7 g/dL      RDW 13 1 %      MPV 10 6 fL      Platelets 393 Thousands/uL      nRBC 0 /100 WBCs      Neutrophils Relative 91 %      Immat GRANS % 0 %      Lymphocytes Relative 5 %      Monocytes Relative 4 %      Eosinophils Relative 0 %      Basophils Relative 0 % Neutrophils Absolute 9 61 Thousands/µL      Immature Grans Absolute 0 04 Thousand/uL      Lymphocytes Absolute 0 54 Thousands/µL      Monocytes Absolute 0 46 Thousand/µL      Eosinophils Absolute 0 02 Thousand/µL      Basophils Absolute 0 02 Thousands/µL     Narrative: This is an appended report  These results have been appended to a previously verified report  No orders to display              Procedures  Procedures         ED Course                                             Medical Decision Making  Patient is a 50-year-old female seen in the emergency department with concern for nausea, vomiting, diarrhea  COVID-19/influenza/RSV swab was ordered in the emergency department  Patient was treated with medication for symptom control, with good effect  Laboratory evaluation remarkable for elevated white blood cell count of 10 69, 91% neutrophils  Patient has a benign abdominal exam in the emergency department  Evaluation is not consistent with appendicitis, cholecystitis, pancreatitis, or bowel obstruction  Patient's symptoms appear to be due to gastroenteritis, possibly viral in etiology versus food poisoning  Plan to have patient follow up with PCP  Patient stable for discharge home  Discharge instructions were reviewed with patient  Nausea, vomiting, and diarrhea: acute illness or injury  Amount and/or Complexity of Data Reviewed  Labs: ordered  Risk  Prescription drug management            Disposition  Final diagnoses:   Nausea, vomiting, and diarrhea     Time reflects when diagnosis was documented in both MDM as applicable and the Disposition within this note     Time User Action Codes Description Comment    1/26/2023 10:38 PM Kailash Portillo Add [R11 2,  R19 7] Nausea, vomiting, and diarrhea     1/26/2023 11:57 PM Kailash Portillo Modify [R11 2,  R19 7] Nausea, vomiting, and diarrhea       ED Disposition     ED Disposition   Discharge    Condition   Stable    Date/Time   Thu Jan 26, 2023 11:56 PM    Comment   Rembertovondalaverne Houston discharge to home/self care  Follow-up Information     Follow up With Specialties Details Why Contact Info Additional Information    Your primary doctor  Call in 1 day       [place] Call  As needed [address] [id number]  [id number] , [place], [place], [address]          Patient's Medications   Discharge Prescriptions    ONDANSETRON (ZOFRAN ODT) 4 MG DISINTEGRATING TABLET    Take 1 tablet (4 mg total) by mouth every 8 (eight) hours as needed for nausea for up to 3 days Do not start before January 27, 2023  Start Date: 1/27/2023 End Date: 1/30/2023       Order Dose: 4 mg       Quantity: 5 tablet    Refills: 0       No discharge procedures on file      PDMP Review     None          ED Provider  Electronically Signed by           Moustapha Paulson MD  01/27/23 0000

## 2023-08-18 ENCOUNTER — OFFICE VISIT (OUTPATIENT)
Dept: FAMILY MEDICINE CLINIC | Facility: CLINIC | Age: 38
End: 2023-08-18

## 2023-08-18 VITALS
SYSTOLIC BLOOD PRESSURE: 107 MMHG | HEIGHT: 61 IN | RESPIRATION RATE: 16 BRPM | BODY MASS INDEX: 22.2 KG/M2 | OXYGEN SATURATION: 98 % | TEMPERATURE: 98.7 F | WEIGHT: 117.6 LBS | DIASTOLIC BLOOD PRESSURE: 75 MMHG | HEART RATE: 103 BPM

## 2023-08-18 DIAGNOSIS — R53.83 FATIGUE, UNSPECIFIED TYPE: ICD-10-CM

## 2023-08-18 DIAGNOSIS — Z71.82 EXERCISE COUNSELING: ICD-10-CM

## 2023-08-18 DIAGNOSIS — Z13.6 SCREENING FOR CARDIOVASCULAR CONDITION: ICD-10-CM

## 2023-08-18 DIAGNOSIS — Z71.3 NUTRITIONAL COUNSELING: ICD-10-CM

## 2023-08-18 DIAGNOSIS — Z98.51 S/P TUBAL LIGATION: ICD-10-CM

## 2023-08-18 DIAGNOSIS — Z13.1 SCREENING FOR DIABETES MELLITUS: ICD-10-CM

## 2023-08-18 DIAGNOSIS — Z00.00 ANNUAL PHYSICAL EXAM: Primary | ICD-10-CM

## 2023-08-18 PROCEDURE — 99395 PREV VISIT EST AGE 18-39: CPT | Performed by: FAMILY MEDICINE

## 2023-08-18 NOTE — ASSESSMENT & PLAN NOTE
Discussed importance of regular physical activity.  - Recommended 150 minutes of moderate physical activity weekly

## 2023-08-18 NOTE — ASSESSMENT & PLAN NOTE
Patient had a tubal ligation done at an outside health network in [facility]. She notes intermittent abdominal wall pain with radiation down into her groin since her surgery was completed. She was interested in following up with a local OB/GYN for further evaluation and potential treatment.   - Ambulatory referral to OB/GYN provided

## 2023-08-18 NOTE — ASSESSMENT & PLAN NOTE
Recommended patient eat well balanced diet with focus on fruits, vegetable, whole grains, and fiber.

## 2023-08-18 NOTE — ASSESSMENT & PLAN NOTE
Patient is asymptomatic today with normal vital signs.   Last lipid panel in 2021.  - Recheck lipid panel today  - Recent CMP unremarkable

## 2023-08-18 NOTE — ASSESSMENT & PLAN NOTE
Today patient reports 3 to 4 months of worsening fatigue. She describes feeling depleted of energy throughout most days. She cannot identify any inciting event or more specifically any prodromal illness that preceded her fatigue. At this point her level of fatigue is beginning to interfere with her daily activities in terms of her ability to complete tasks in an efficient manner. She has to take breaks in between normal daily activities to rest.    She does note that her periods have been heavier over the last several months denies any other associated symptoms. She denies any family history of thyroid problems. She reports no changes in her mood, no changes in her sleep pattern, or dizziness associated with postural changes.   - Check CBC, TSH  - Will check iron studies if anemic  - Does not screen positive for sleep apnea but could potentially check sleep study in the future if no other clear etiology is found

## 2023-08-18 NOTE — ASSESSMENT & PLAN NOTE
Patient had normal weight and BMI but has strong family history of type 2 diabetes.   Has not been screened for diabetes in the past.  - Hemoglobin A1c ordered

## 2023-08-18 NOTE — PROGRESS NOTES
[address] [facility] [place]    NAME: Russell Quintanilla  AGE: 43 y.o. SEX: female  : 1985     DATE: 2023     Assessment and Plan:     Problem List Items Addressed This Visit        Other    Annual physical exam - Primary     Patient is up-to-date on her routine healthcare maintenance. Screening for diabetes mellitus     Patient had normal weight and BMI but has strong family history of type 2 diabetes. Has not been screened for diabetes in the past.  - Hemoglobin A1c ordered         Relevant Orders    Hemoglobin A1C    Screening for cardiovascular condition     Patient is asymptomatic today with normal vital signs. Last lipid panel in .  - Recheck lipid panel today  - Recent CMP unremarkable         Relevant Orders    Lipid panel    Nutritional counseling     Recommended patient eat well balanced diet with focus on fruits, vegetable, whole grains, and fiber. Exercise counseling     Discussed importance of regular physical activity.  - Recommended 150 minutes of moderate physical activity weekly           Fatigue     Today patient reports 3 to 4 months of worsening fatigue. She describes feeling depleted of energy throughout most days. She cannot identify any inciting event or more specifically any prodromal illness that preceded her fatigue. At this point her level of fatigue is beginning to interfere with her daily activities in terms of her ability to complete tasks in an efficient manner. She has to take breaks in between normal daily activities to rest.    She does note that her periods have been heavier over the last several months denies any other associated symptoms. She denies any family history of thyroid problems. She reports no changes in her mood, no changes in her sleep pattern, or dizziness associated with postural changes.   - Check CBC, TSH  - Will check iron studies if anemic  - Does not screen positive for sleep apnea but could potentially check sleep study in the future if no other clear etiology is found         Relevant Orders    TSH, 3rd generation with Free T4 reflex    CBC and differential    S/P tubal ligation     Patient had a tubal ligation done at an outside health network in [facility]. She notes intermittent abdominal wall pain with radiation down into her groin since her surgery was completed. She was interested in following up with a local OB/GYN for further evaluation and potential treatment. - Ambulatory referral to OB/GYN provided         Relevant Orders    Ambulatory Referral to Obstetrics / Gynecology       Immunizations and preventive care screenings were discussed with patient today. Appropriate education was printed on patient's after visit summary. Counseling:  Alcohol/drug use: discussed moderation in alcohol intake, the recommendations for healthy alcohol use, and avoidance of illicit drug use. Dental Health: discussed importance of regular tooth brushing, flossing, and dental visits. Injury prevention: discussed safety/seat belts, safety helmets, smoke detectors, carbon dioxide detectors, and smoking near bedding or upholstery. Sexual health: discussed sexually transmitted diseases, partner selection, use of condoms, avoidance of unintended pregnancy, and contraceptive alternatives. · Exercise: the importance of regular exercise/physical activity was discussed. Recommend exercise 3-5 times per week for at least 30 minutes. Depression Screening and Follow-up Plan: Patient was screened for depression during today's encounter. They screened negative with a PHQ-2 score of 0.         Return in 1 year (on 8/18/2024) for Annual physical.     Chief Complaint:     Chief Complaint   Patient presents with   • Physical Exam     Loss of energy      History of Present Illness:     Adult Annual Physical   Patient here for a comprehensive physical exam. The patient reports Fatigue. Diet and Physical Activity  · Diet/Nutrition: well balanced diet and consuming 3-5 servings of fruits/vegetables daily. · Exercise: walking and more than 2 hours on average. Depression Screening  PHQ-2/9 Depression Screening    Little interest or pleasure in doing things: 0 - not at all  Feeling down, depressed, or hopeless: 0 - not at all  PHQ-2 Score: 0  PHQ-2 Interpretation: Negative depression screen       General Health  · Sleep: sleeps well, gets 4-6 hours of sleep on average and unrefreshing sleep. · Hearing: normal - bilateral.  · Vision: no vision problems. · Dental: regular dental visits. /GYN Health  · Last menstrual period: Current  · Contraceptive method: tubal ligation. · History of STDs?: no.     Review of Systems:     Review of Systems   Constitutional: Positive for activity change (less 2/2 fatigue) and fatigue. Negative for fever and unexpected weight change. HENT: Negative. Eyes: Negative. Respiratory: Negative. Negative for cough and shortness of breath. Cardiovascular: Negative for chest pain, palpitations and leg swelling. Gastrointestinal: Positive for abdominal pain (intermittent, started after her tubal ligation). Endocrine: Negative for cold intolerance and heat intolerance. Genitourinary: Negative. Musculoskeletal: Negative. Skin: Negative for rash. Neurological: Negative. Psychiatric/Behavioral: Negative.        Past Medical History:     Past Medical History:   Diagnosis Date   • COVID-19 08/02/2021    chills, body aches, headache, loss of taste and smell   • Dysmenorrhea    • LLQ pain     and cramping      Past Surgical History:     Past Surgical History:   Procedure Laterality Date   • COLONOSCOPY      2017/2018   • LAPAROSCOPY      exploratory   • TUBAL LIGATION  2017      Social History:     Social History     Socioeconomic History   • Marital status: Single     Spouse name: Not on file   • Number of children: Not on file   • Years of education: Not on file   • Highest education level: Not on file   Occupational History   • Not on file   Tobacco Use   • Smoking status: Never   • Smokeless tobacco: Never   • Tobacco comments:     flavored vape   Vaping Use   • Vaping Use: Some days   • Substances: Nicotine, Flavoring   Substance and Sexual Activity   • Alcohol use: Yes     Alcohol/week: 2.0 standard drinks of alcohol     Types: 2 Glasses of wine per week     Comment: socially   • Drug use: Never   • Sexual activity: Yes     Partners: Female     Birth control/protection: Surgical   Other Topics Concern   • Not on file   Social History Narrative   • Not on file     Social Determinants of Health     Financial Resource Strain: Low Risk  (8/18/2023)    Overall Financial Resource Strain (CARDIA)    • Difficulty of Paying Living Expenses: Not hard at all   Food Insecurity: No Food Insecurity (8/18/2023)    Hunger Vital Sign    • Worried About Running Out of Food in the Last Year: Never true    • Ran Out of Food in the Last Year: Never true   Transportation Needs: No Transportation Needs (8/18/2023)    PRAPARE - Transportation    • Lack of Transportation (Medical): No    • Lack of Transportation (Non-Medical):  No   Physical Activity: Insufficiently Active (8/18/2023)    Exercise Vital Sign    • Days of Exercise per Week: 2 days    • Minutes of Exercise per Session: 20 min   Stress: No Stress Concern Present (8/18/2023)    [address] [facility]    • Feeling of Stress : Not at all   Social Connections: Socially Isolated (8/18/2023)    Social Connection and Isolation Panel [NHANES]    • Frequency of Communication with Friends and Family: Once a week    • Frequency of Social Gatherings with Friends and Family: Twice a week    • Attends Holiness Services: Never    • Active Member of Clubs or Organizations: No    • Attends Club or Organization Meetings: Never    • Marital Status: Never    Intimate Partner Violence: Not At Risk (8/18/2023)    Humiliation, Afraid, Rape, and Kick questionnaire    • Fear of Current or Ex-Partner: No    • Emotionally Abused: No    • Physically Abused: No    • Sexually Abused: No   Housing Stability: Low Risk  (8/18/2023)    Housing Stability Vital Sign    • Unable to Pay for Housing in the Last Year: No    • Number of Places Lived in the Last Year: 1    • Unstable Housing in the Last Year: No      Family History:     Family History   Problem Relation Age of Onset   • Aneurysm Mother    • Stomach cancer Mother    • Colon cancer Other    • No Known Problems Father    • Colon cancer Paternal Grandmother    • Cancer Paternal Grandmother         colon      Current Medications:     Current Outpatient Medications   Medication Sig Dispense Refill   • ondansetron (Zofran ODT) 4 mg disintegrating tablet Take 1 tablet (4 mg total) by mouth every 8 (eight) hours as needed for nausea for up to 3 days Do not start before January 27, 2023. 5 tablet 0     No current facility-administered medications for this visit. Allergies:     No Known Allergies   Physical Exam:     /75 (BP Location: Left arm, Patient Position: Sitting, Cuff Size: Standard)   Pulse 103   Temp 98.7 °F (37.1 °C) (Temporal)   Resp 16   Ht 5' 1" (1.549 m)   Wt 53.3 kg (117 lb 9.6 oz)   SpO2 98%   BMI 22.22 kg/m²     Physical Exam  Vitals reviewed. Constitutional:       General: She is not in acute distress. Appearance: Normal appearance. She is normal weight. She is not ill-appearing. HENT:      Head: Normocephalic and atraumatic. Right Ear: Tympanic membrane, ear canal and external ear normal. There is no impacted cerumen. Left Ear: Tympanic membrane, ear canal and external ear normal. There is no impacted cerumen. Nose: Nose normal. No rhinorrhea. Mouth/Throat:      Mouth: Mucous membranes are moist.      Pharynx: Oropharynx is clear.  No posterior oropharyngeal erythema. Eyes:      Extraocular Movements: Extraocular movements intact. Conjunctiva/sclera: Conjunctivae normal.      Pupils: Pupils are equal, round, and reactive to light. Comments: No exophthalmosis noted   Cardiovascular:      Rate and Rhythm: Normal rate and regular rhythm. Pulses: Normal pulses. Heart sounds: Normal heart sounds. No murmur heard. Pulmonary:      Effort: Pulmonary effort is normal.      Breath sounds: Normal breath sounds. No wheezing or rhonchi. Abdominal:      General: Abdomen is flat. Bowel sounds are normal. There is no distension. Palpations: Abdomen is soft. Tenderness: There is no abdominal tenderness. There is no guarding. Musculoskeletal:         General: Normal range of motion. Cervical back: Normal range of motion and neck supple. Right lower leg: No edema. Left lower leg: No edema. Lymphadenopathy:      Cervical: No cervical adenopathy. Skin:     General: Skin is warm and dry. Capillary Refill: Capillary refill takes less than 2 seconds. Findings: No rash. Neurological:      General: No focal deficit present. Mental Status: She is alert and oriented to person, place, and time.    Psychiatric:         Mood and Affect: Mood normal.         Behavior: Behavior normal.          Clive Diggs MD   [facility]

## 2023-08-21 ENCOUNTER — APPOINTMENT (OUTPATIENT)
Dept: LAB | Facility: CLINIC | Age: 38
End: 2023-08-21
Payer: COMMERCIAL

## 2023-08-21 DIAGNOSIS — Z13.6 SCREENING FOR CARDIOVASCULAR CONDITION: ICD-10-CM

## 2023-08-21 DIAGNOSIS — Z13.1 SCREENING FOR DIABETES MELLITUS: ICD-10-CM

## 2023-08-21 DIAGNOSIS — R53.83 FATIGUE, UNSPECIFIED TYPE: ICD-10-CM

## 2023-08-21 LAB
BASOPHILS # BLD AUTO: 0.04 THOUSANDS/ÂΜL (ref 0–0.1)
BASOPHILS NFR BLD AUTO: 1 % (ref 0–1)
CHOLEST SERPL-MCNC: 149 MG/DL
EOSINOPHIL # BLD AUTO: 0.32 THOUSAND/ÂΜL (ref 0–0.61)
EOSINOPHIL NFR BLD AUTO: 4 % (ref 0–6)
ERYTHROCYTE [DISTWIDTH] IN BLOOD BY AUTOMATED COUNT: 13.1 % (ref 11.6–15.1)
EST. AVERAGE GLUCOSE BLD GHB EST-MCNC: 117 MG/DL
HBA1C MFR BLD: 5.7 %
HCT VFR BLD AUTO: 35.5 % (ref 34.8–46.1)
HDLC SERPL-MCNC: 60 MG/DL
HGB BLD-MCNC: 11.5 G/DL (ref 11.5–15.4)
IMM GRANULOCYTES # BLD AUTO: 0.02 THOUSAND/UL (ref 0–0.2)
IMM GRANULOCYTES NFR BLD AUTO: 0 % (ref 0–2)
LDLC SERPL CALC-MCNC: 72 MG/DL (ref 0–100)
LYMPHOCYTES # BLD AUTO: 2.18 THOUSANDS/ÂΜL (ref 0.6–4.47)
LYMPHOCYTES NFR BLD AUTO: 26 % (ref 14–44)
MCH RBC QN AUTO: 31.3 PG (ref 26.8–34.3)
MCHC RBC AUTO-ENTMCNC: 32.4 G/DL (ref 31.4–37.4)
MCV RBC AUTO: 97 FL (ref 82–98)
MONOCYTES # BLD AUTO: 0.6 THOUSAND/ÂΜL (ref 0.17–1.22)
MONOCYTES NFR BLD AUTO: 7 % (ref 4–12)
NEUTROPHILS # BLD AUTO: 5.38 THOUSANDS/ÂΜL (ref 1.85–7.62)
NEUTS SEG NFR BLD AUTO: 62 % (ref 43–75)
NONHDLC SERPL-MCNC: 89 MG/DL
NRBC BLD AUTO-RTO: 0 /100 WBCS
PLATELET # BLD AUTO: 250 THOUSANDS/UL (ref 149–390)
PMV BLD AUTO: 11 FL (ref 8.9–12.7)
RBC # BLD AUTO: 3.68 MILLION/UL (ref 3.81–5.12)
TRIGL SERPL-MCNC: 86 MG/DL
TSH SERPL DL<=0.05 MIU/L-ACNC: 0.91 UIU/ML (ref 0.45–4.5)
WBC # BLD AUTO: 8.54 THOUSAND/UL (ref 4.31–10.16)

## 2023-08-21 PROCEDURE — 84443 ASSAY THYROID STIM HORMONE: CPT

## 2023-08-21 PROCEDURE — 80061 LIPID PANEL: CPT

## 2023-08-21 PROCEDURE — 36415 COLL VENOUS BLD VENIPUNCTURE: CPT

## 2023-08-21 PROCEDURE — 85025 COMPLETE CBC W/AUTO DIFF WBC: CPT

## 2023-08-21 PROCEDURE — 83036 HEMOGLOBIN GLYCOSYLATED A1C: CPT

## 2023-09-15 ENCOUNTER — VBI (OUTPATIENT)
Dept: ADMINISTRATIVE | Facility: OTHER | Age: 38
End: 2023-09-15

## 2023-09-29 ENCOUNTER — TELEPHONE (OUTPATIENT)
Dept: FAMILY MEDICINE CLINIC | Facility: CLINIC | Age: 38
End: 2023-09-29

## 2023-10-17 PROBLEM — Z13.6 SCREENING FOR CARDIOVASCULAR CONDITION: Status: RESOLVED | Noted: 2023-08-18 | Resolved: 2023-10-17

## 2023-10-17 PROBLEM — Z13.1 SCREENING FOR DIABETES MELLITUS: Status: RESOLVED | Noted: 2023-08-18 | Resolved: 2023-10-17

## 2024-01-06 ENCOUNTER — APPOINTMENT (EMERGENCY)
Dept: CT IMAGING | Facility: HOSPITAL | Age: 39
End: 2024-01-06
Payer: COMMERCIAL

## 2024-01-06 ENCOUNTER — HOSPITAL ENCOUNTER (EMERGENCY)
Facility: HOSPITAL | Age: 39
Discharge: HOME | End: 2024-01-06
Attending: EMERGENCY MEDICINE | Admitting: EMERGENCY MEDICINE
Payer: COMMERCIAL

## 2024-01-06 VITALS
OXYGEN SATURATION: 99 % | RESPIRATION RATE: 16 BRPM | BODY MASS INDEX: 22.58 KG/M2 | WEIGHT: 115 LBS | HEART RATE: 80 BPM | DIASTOLIC BLOOD PRESSURE: 69 MMHG | HEIGHT: 60 IN | SYSTOLIC BLOOD PRESSURE: 116 MMHG | TEMPERATURE: 98.7 F

## 2024-01-06 DIAGNOSIS — R10.32 LEFT LOWER QUADRANT ABDOMINAL PAIN: Primary | ICD-10-CM

## 2024-01-06 LAB
ALBUMIN SERPL BCP-MCNC: 3.7 G/DL (ref 3.5–5)
ALP SERPL-CCNC: 37 U/L (ref 34–104)
ALT SERPL W P-5'-P-CCNC: 6 U/L (ref 7–52)
ANION GAP SERPL CALCULATED.3IONS-SCNC: 6 MMOL/L
AST SERPL W P-5'-P-CCNC: 11 U/L (ref 13–39)
BASOPHILS # BLD AUTO: 0.02 THOUSANDS/ÂΜL (ref 0–0.1)
BASOPHILS NFR BLD AUTO: 0 % (ref 0–1)
BILIRUB SERPL-MCNC: 0.31 MG/DL (ref 0.2–1)
BILIRUB UR QL STRIP: NEGATIVE
BUN SERPL-MCNC: 12 MG/DL (ref 5–25)
CALCIUM SERPL-MCNC: 8.9 MG/DL (ref 8.4–10.2)
CHLORIDE SERPL-SCNC: 106 MMOL/L (ref 96–108)
CLARITY UR: CLEAR
CO2 SERPL-SCNC: 23 MMOL/L (ref 21–32)
COLOR UR: YELLOW
CREAT SERPL-MCNC: 0.7 MG/DL (ref 0.6–1.3)
EOSINOPHIL # BLD AUTO: 0.21 THOUSAND/ÂΜL (ref 0–0.61)
EOSINOPHIL NFR BLD AUTO: 2 % (ref 0–6)
ERYTHROCYTE [DISTWIDTH] IN BLOOD BY AUTOMATED COUNT: 13.1 % (ref 11.6–15.1)
EXT PREGNANCY TEST URINE: NEGATIVE
EXT. CONTROL: NORMAL
GFR SERPL CREATININE-BSD FRML MDRD: 109 ML/MIN/1.73SQ M
GLUCOSE SERPL-MCNC: 112 MG/DL (ref 65–140)
GLUCOSE UR STRIP-MCNC: NEGATIVE MG/DL
HCT VFR BLD AUTO: 33.7 % (ref 34.8–46.1)
HGB BLD-MCNC: 11 G/DL (ref 11.5–15.4)
HGB UR QL STRIP.AUTO: NEGATIVE
IMM GRANULOCYTES # BLD AUTO: 0.02 THOUSAND/UL (ref 0–0.2)
IMM GRANULOCYTES NFR BLD AUTO: 0 % (ref 0–2)
KETONES UR STRIP-MCNC: NEGATIVE MG/DL
LEUKOCYTE ESTERASE UR QL STRIP: NEGATIVE
LYMPHOCYTES # BLD AUTO: 2.42 THOUSANDS/ÂΜL (ref 0.6–4.47)
LYMPHOCYTES NFR BLD AUTO: 23 % (ref 14–44)
MCH RBC QN AUTO: 31.2 PG (ref 26.8–34.3)
MCHC RBC AUTO-ENTMCNC: 32.6 G/DL (ref 31.4–37.4)
MCV RBC AUTO: 96 FL (ref 82–98)
MONOCYTES # BLD AUTO: 0.63 THOUSAND/ÂΜL (ref 0.17–1.22)
MONOCYTES NFR BLD AUTO: 6 % (ref 4–12)
NEUTROPHILS # BLD AUTO: 7.08 THOUSANDS/ÂΜL (ref 1.85–7.62)
NEUTS SEG NFR BLD AUTO: 69 % (ref 43–75)
NITRITE UR QL STRIP: NEGATIVE
NRBC BLD AUTO-RTO: 0 /100 WBCS
PH UR STRIP.AUTO: 6 [PH]
PLATELET # BLD AUTO: 255 THOUSANDS/UL (ref 149–390)
PMV BLD AUTO: 10.5 FL (ref 8.9–12.7)
POTASSIUM SERPL-SCNC: 3.5 MMOL/L (ref 3.5–5.3)
PROT SERPL-MCNC: 6.5 G/DL (ref 6.4–8.4)
PROT UR STRIP-MCNC: NEGATIVE MG/DL
RBC # BLD AUTO: 3.53 MILLION/UL (ref 3.81–5.12)
SODIUM SERPL-SCNC: 135 MMOL/L (ref 135–147)
SP GR UR STRIP.AUTO: 1.02 (ref 1–1.03)
UROBILINOGEN UR QL STRIP.AUTO: 0.2 E.U./DL
WBC # BLD AUTO: 10.38 THOUSAND/UL (ref 4.31–10.16)

## 2024-01-06 PROCEDURE — 85025 COMPLETE CBC W/AUTO DIFF WBC: CPT

## 2024-01-06 PROCEDURE — 80053 COMPREHEN METABOLIC PANEL: CPT

## 2024-01-06 PROCEDURE — 81025 URINE PREGNANCY TEST: CPT

## 2024-01-06 PROCEDURE — 99285 EMERGENCY DEPT VISIT HI MDM: CPT | Performed by: EMERGENCY MEDICINE

## 2024-01-06 PROCEDURE — 74177 CT ABD & PELVIS W/CONTRAST: CPT

## 2024-01-06 PROCEDURE — 81003 URINALYSIS AUTO W/O SCOPE: CPT

## 2024-01-06 PROCEDURE — 99284 EMERGENCY DEPT VISIT MOD MDM: CPT

## 2024-01-06 PROCEDURE — 96374 THER/PROPH/DIAG INJ IV PUSH: CPT

## 2024-01-06 PROCEDURE — 36415 COLL VENOUS BLD VENIPUNCTURE: CPT

## 2024-01-06 PROCEDURE — G1004 CDSM NDSC: HCPCS

## 2024-01-06 RX ORDER — KETOROLAC TROMETHAMINE 30 MG/ML
15 INJECTION, SOLUTION INTRAMUSCULAR; INTRAVENOUS ONCE
Status: COMPLETED | OUTPATIENT
Start: 2024-01-06 | End: 2024-01-06

## 2024-01-06 RX ADMIN — IOHEXOL 80 ML: 350 INJECTION, SOLUTION INTRAVENOUS at 20:24

## 2024-01-06 RX ADMIN — KETOROLAC TROMETHAMINE 15 MG: 30 INJECTION, SOLUTION INTRAMUSCULAR at 19:58

## 2024-01-07 NOTE — ED PROVIDER NOTES
"History  Chief Complaint   Patient presents with    Abdominal Pain     PT \"I started having lower abd pain around 1300. It is like staying there and not going away. I took Tylenol. And it moises goes into my back too\" Pt denies CP, SOB, pain with voiding, diarrhea or n/v     The patient is a 37yo F presenting with LLQ and suprapubic abdominal pain x 7 hours. She states that she has been having episodes of lower abdominal pain since her tubal ligation in 2017. The pain episodes are cramping, radiate to her butt, occur several times per month, and last for 20 minutes at a time. She usually takes Tylenol for these episodes. She has been extensively worked-up for the pain episodes, including surgery to evaluate for endometriosis - which was negative. She has been diagnosed with neuralgia of left pudendal nerve. She says the pain episode today started off the same as her usual episodes, but it has now been over 7 hours and has not yet resolved, so she came to the ED for evaluation. This is the first time the episode of abdominal pain has lasted this long. The pain is LLQ and suprapubic, cramping, and a 6/10 in intensity. It is currently not radiating. She had 2 normal bowel movements today, and ate cereal before coming to the ED, without issues. She took Tylenol without relief. She does not take any prescription medications. She denies fever, chills, chest pain, SOB, nausea, vomiting, diarrhea, or constipation. She endorses urinary frequency since yesterday, without dysuria or hematuria. No recent illnesses, sick contacts, or questionable foods. She says she has heavy menstrual periods, and her LMP was 2 weeks ago and was normal for her.       Abdominal Pain  Associated symptoms: no chest pain, no chills, no constipation, no cough, no dysuria, no fever and no hematuria        Prior to Admission Medications   Prescriptions Last Dose Informant Patient Reported? Taking?   ondansetron (Zofran ODT) 4 mg disintegrating tablet   " No No   Sig: Take 1 tablet (4 mg total) by mouth every 8 (eight) hours as needed for nausea for up to 3 days Do not start before January 27, 2023.      Facility-Administered Medications: None       Past Medical History:   Diagnosis Date    COVID-19 08/02/2021    chills, body aches, headache, loss of taste and smell    Dysmenorrhea     LLQ pain     and cramping       Past Surgical History:   Procedure Laterality Date    COLONOSCOPY      2017/2018    LAPAROSCOPY      exploratory    TUBAL LIGATION  2017       Family History   Problem Relation Age of Onset    Aneurysm Mother     Stomach cancer Mother     Colon cancer Other     No Known Problems Father     Colon cancer Paternal Grandmother     Cancer Paternal Grandmother         colon     I have reviewed and agree with the history as documented.    E-Cigarette/Vaping    E-Cigarette Use Former User      E-Cigarette/Vaping Substances    Nicotine Yes     THC No     CBD No     Flavoring Yes     Other No     Unknown No      Social History     Tobacco Use    Smoking status: Never    Smokeless tobacco: Never    Tobacco comments:     flavored vape   Vaping Use    Vaping status: Former    Substances: Nicotine, Flavoring   Substance Use Topics    Alcohol use: Yes     Alcohol/week: 2.0 standard drinks of alcohol     Types: 2 Glasses of wine per week     Comment: socially    Drug use: Never        Review of Systems   Constitutional:  Negative for appetite change, chills and fever.   HENT:  Negative for rhinorrhea.    Respiratory:  Negative for cough.    Cardiovascular:  Negative for chest pain.   Gastrointestinal:  Positive for abdominal pain. Negative for constipation.   Genitourinary:  Positive for frequency. Negative for dysuria, flank pain and hematuria.   Neurological:  Negative for weakness, numbness and headaches.       Physical Exam  ED Triage Vitals [01/06/24 1919]   Temperature Pulse Respirations Blood Pressure SpO2   98.7 °F (37.1 °C) 77 16 124/52 100 %      Temp Source  Heart Rate Source Patient Position - Orthostatic VS BP Location FiO2 (%)   Oral Monitor Sitting Left arm --      Pain Score       6             Orthostatic Vital Signs  Vitals:    01/06/24 1919 01/06/24 2115   BP: 124/52 116/69   Pulse: 77 80   Patient Position - Orthostatic VS: Sitting Lying       Physical Exam  Vitals and nursing note reviewed.   Constitutional:       General: She is not in acute distress.     Appearance: She is not ill-appearing.   HENT:      Head: Normocephalic and atraumatic.   Cardiovascular:      Rate and Rhythm: Normal rate and regular rhythm.   Pulmonary:      Breath sounds: Normal breath sounds.   Abdominal:      General: A surgical scar is present. Bowel sounds are normal. There is no distension.      Palpations: Abdomen is soft. There is no mass.      Tenderness: There is abdominal tenderness in the suprapubic area and left lower quadrant. There is no right CVA tenderness, left CVA tenderness, guarding or rebound.      Hernia: No hernia is present.   Musculoskeletal:      Right lower leg: No edema.      Left lower leg: No edema.      Comments: Full, painless ROM of the lower extremities bilaterally.    Skin:     General: Skin is warm and dry.   Neurological:      Mental Status: She is alert.         ED Medications  Medications   ketorolac (TORADOL) injection 15 mg (15 mg Intravenous Given 1/6/24 1958)   iohexol (OMNIPAQUE) 350 MG/ML injection (SINGLE-DOSE) 80 mL (80 mL Intravenous Given 1/6/24 2024)       Diagnostic Studies  Results Reviewed       Procedure Component Value Units Date/Time    Comprehensive metabolic panel [[id number]]  (Abnormal) Collected: 01/06/24 1956    Lab Status: Final result Specimen: Blood from Arm, Left Updated: 01/06/24 2016     Sodium 135 mmol/L      Potassium 3.5 mmol/L      Chloride 106 mmol/L      CO2 23 mmol/L      ANION GAP 6 mmol/L      BUN 12 mg/dL      Creatinine 0.70 mg/dL      Glucose 112 mg/dL      Calcium 8.9 mg/dL      AST 11 U/L      ALT 6 U/L       Alkaline Phosphatase 37 U/L      Total Protein 6.5 g/dL      Albumin 3.7 g/dL      Total Bilirubin 0.31 mg/dL      eGFR 109 ml/min/1.73sq m     Narrative:      National Kidney Disease Foundation guidelines for Chronic Kidney Disease (CKD):     Stage 1 with normal or high GFR (GFR > 90 mL/min/1.73 square meters)    Stage 2 Mild CKD (GFR = 60-89 mL/min/1.73 square meters)    Stage 3A Moderate CKD (GFR = 45-59 mL/min/1.73 square meters)    Stage 3B Moderate CKD (GFR = 30-44 mL/min/1.73 square meters)    Stage 4 Severe CKD (GFR = 15-29 mL/min/1.73 square meters)    Stage 5 End Stage CKD (GFR <15 mL/min/1.73 square meters)  Note: GFR calculation is accurate only with a steady state creatinine    UA w Reflex to Microscopic w Reflex to Culture [[id number]]  (Normal) Collected: 01/06/24 1956    Lab Status: Final result Specimen: Urine, Clean Catch Updated: 01/06/24 2002     Color, UA Yellow     Clarity, UA Clear     Specific Gravity, UA 1.020     pH, UA 6.0     Leukocytes, UA Negative     Nitrite, UA Negative     Protein, UA Negative mg/dl      Glucose, UA Negative mg/dl      Ketones, UA Negative mg/dl      Urobilinogen, UA 0.2 E.U./dl      Bilirubin, UA Negative     Occult Blood, UA Negative    CBC and differential [[id number]]  (Abnormal) Collected: 01/06/24 1956    Lab Status: Final result Specimen: Blood from Arm, Left Updated: 01/06/24 2001     WBC 10.38 Thousand/uL      RBC 3.53 Million/uL      Hemoglobin 11.0 g/dL      Hematocrit 33.7 %      MCV 96 fL      MCH 31.2 pg      MCHC 32.6 g/dL      RDW 13.1 %      MPV 10.5 fL      Platelets 255 Thousands/uL      nRBC 0 /100 WBCs      Neutrophils Relative 69 %      Immat GRANS % 0 %      Lymphocytes Relative 23 %      Monocytes Relative 6 %      Eosinophils Relative 2 %      Basophils Relative 0 %      Neutrophils Absolute 7.08 Thousands/µL      Immature Grans Absolute 0.02 Thousand/uL      Lymphocytes Absolute 2.42 Thousands/µL      Monocytes Absolute 0.63 Thousand/µL       Eosinophils Absolute 0.21 Thousand/µL      Basophils Absolute 0.02 Thousands/µL     POCT pregnancy, urine [[id number]]  (Normal) Resulted: 01/06/24 1954    Lab Status: Final result Updated: 01/06/24 1954     EXT Preg Test, Ur Negative     Control Valid                   CT abdomen pelvis with contrast   Final Result by Alon Arndt MD (01/06 2129)      No acute abdominopelvic abnormality identified.      Trace pelvic free fluid likely physiologic given patient age and gender.            Workstation performed: [id number]               Procedures  Procedures      ED Course  ED Course as of 01/06/24 2154   Sat Jan 06, 2024 2049 The patient reports that her pain has completely resolved after the Toradol                             SBIRT 22yo+      Flowsheet Row Most Recent Value   Initial Alcohol Screen: US AUDIT-C     1. How often do you have a drink containing alcohol? 0 Filed at: 01/06/2024 2002   2. How many drinks containing alcohol do you have on a typical day you are drinking?  0 Filed at: 01/06/2024 2002   3b. FEMALE Any Age, or MALE 65+: How often do you have 4 or more drinks on one occassion? 0 Filed at: 01/06/2024 2002   Audit-C Score 0 Filed at: 01/06/2024 2002   ROXANA: How many times in the past year have you...    Used an illegal drug or used a prescription medication for non-medical reasons? Never Filed at: 01/06/2024 2002                  Medical Decision Making  This is a 39yo F presenting with LLQ and suprapubic cramping pain beginning 7 hours prior to presentation, associated with urinary frequency. She says she has similar symptoms intermittently since her tubal ligation in 2017, but the episodes usually resolve after 20 minutes, and have never lasted this long. The pain is cramping, and 6/10 in intensity. No nausea, vomiting, diarrhea, constipation, or change in appetite. In the ED, the patient is afebrile and vitals are stable. Physical exam is significant for suprapubic and LLQ tenderness to  palpation. Labs are significant for a WBC of 10.38. UA was normal, and there is low suspicion for UTI. Pregnancy test was negative. The pain completely resolved after a dose of Toradol. CT abdomen pelvis was negative for abdominopelvic abnormality. The cause of this patient's chronic intermittent abdominal pain is not clear. There is low suspicion for an acute emergent process, including cholecystitis, appendicitis, or ectopic pregnancy. The patient is stable for discharge home. The patient was advised to follow up with her PCP. She is agreeable with the plan.     Amount and/or Complexity of Data Reviewed  Labs: ordered.  Radiology: ordered.          Disposition  Final diagnoses:   Left lower quadrant abdominal pain     Time reflects when diagnosis was documented in both MDM as applicable and the Disposition within this note       Time User Action Codes Description Comment    1/6/2024  9:35 PM Sweta Moon Add [R10.32] Left lower quadrant abdominal pain           ED Disposition       ED Disposition   Discharge    Condition   Stable    Date/Time   Sat Jan 6, 2024 2134    Comment   Maritza Macdonald discharge to home/self care.                   Follow-up Information    None         Discharge Medication List as of 1/6/2024  9:39 PM        CONTINUE these medications which have NOT CHANGED    Details   ondansetron (Zofran ODT) 4 mg disintegrating tablet Take 1 tablet (4 mg total) by mouth every 8 (eight) hours as needed for nausea for up to 3 days Do not start before January 27, 2023., Starting Fri 1/27/2023, Until Mon 1/30/2023 at 2359, Normal           No discharge procedures on file.    PDMP Review       None             ED Provider  Attending physically available and evaluated Maritza Macdonald. I managed the patient along with the ED Attending.    Electronically Signed by           Sweta Moon MD  01/06/24 1775

## 2024-01-07 NOTE — ED ATTENDING ATTESTATION
1/6/2024  I, Ludmila Vernon MD, saw and evaluated the patient. I have discussed the patient with the resident/non-physician practitioner and agree with the resident's/non-physician practitioner's findings, Plan of Care, and MDM as documented in the resident's/non-physician practitioner's note, except where noted. All available labs and Radiology studies were reviewed.  I was present for key portions of any procedure(s) performed by the resident/non-physician practitioner and I was immediately available to provide assistance.       At this point I agree with the current assessment done in the Emergency Department.  I have conducted an independent evaluation of this patient a history and physical is as follows:    38-year-old female with no pertinent past medical history who presents for evaluation of abdominal pain.  Patient reports left lower quadrant abdominal pain that she feels like radiates to her anus at times.  She has had similar pain in the past after having her tubal ligation.  She states that she will get this pain intermittently, usually last for approximately 20 minutes, and then resolved after taking Tylenol.  She states that she has been evaluated for this in the past and that they feel as though her pain is related to a nerve injury from her surgery.  Today, however, the pain has been persistent and was not relieved by Tylenol.  She denies any associated symptoms including nausea, vomiting, diarrhea, constipation.  She has had some urinary frequency but no dysuria, or other urinary symptoms.  No fevers that she is aware of.    Exam: Awake, alert, no acute distress.  Head normocephalic and atraumatic.  Moist mucous membranes.  Nose normal.  Normal conjunctiva.  Neck supple with normal range of motion.  Heart with regular rate and rhythm, no murmurs.  Lungs clear to auscultation bilaterally.  Abdomen soft, nondistended.  Left lower quadrant tenderness without rebound or guarding.  Extremities without  swelling, normal range of motion.  Skin without rashes.  No focal neurologic deficit.    38-year-old female presenting for evaluation of left lower quadrant abdominal pain.  Differential diagnoses include but not limited to diverticulitis, ovarian cyst, ovarian torsion, UTI, obstruction, perforation, colitis, ectopic pregnancy.  Labs overall unremarkable apart from mild leukocytosis which is nonspecific.  Pregnancy test negative.  UA without evidence of UTI.  CT abdomen pelvis without any acute pathology noted.  Patient treated symptomatically with Toradol with improvement in symptoms.  Otherwise stable for discharge.  Advised follow-up.    ED Course  ED Course as of 01/06/24 2149   Sat Jan 06, 2024 1956 PREGNANCY TEST URINE: Negative   2003 UA w Reflex to Microscopic w Reflex to Culture   2003 CBC and differential(!)   2017 Comprehensive metabolic panel(!)         Critical Care Time  Procedures

## 2024-01-07 NOTE — DISCHARGE INSTRUCTIONS
You may take Acetaminophen (Tylenol) and Ibuprofen (Motrin) at home for your abdominal pain.     Please call to make a follow up appointment with your primary care physician.     Please seek care or return to the Emergency Department if you have worsening symptoms, or any symptoms that are concerning for you.

## 2024-05-16 ENCOUNTER — NURSE TRIAGE (OUTPATIENT)
Age: 39
End: 2024-05-16

## 2024-05-16 NOTE — TELEPHONE ENCOUNTER
"Placed call to patient, complaint of vaginal discharge white, vaginal odor smell unable to pin point what kind of smell.  Denies itching, irritation, dysuria.  Denies antibiotic(s) use, use of different soaps, lotions, creams, or hot tub/swimsuit use. Denies antibiotic(s) use.  Patient states after intercourse this seems to happen as her  ejaculates inside her. Patient is urinating after intercourse.  Patient offered appointment tomorrow but unable to take it due to work. Appointment scheduled on Monday. Relocated from [place] to [place]      Reason for Disposition  • Symptoms of a yeast infection (i.e., itchy, white discharge, not bad smelling) and not improved > 3 days following CARE ADVICE    Answer Assessment - Initial Assessment Questions  1. SYMPTOM: \"What's the main symptom you're concerned about?\" (e.g., pain, itching, dryness)      Vaginal discharge white, vaginal odor smell unable to pin point what kind of smell.  Denies itching, irritation, dysuria.     3. ONSET: \"When did the  symptoms  start?\"      1.5-2 weeks   6. CAUSE: \"What do you think is causing the discharge?\" \"Have you had the same problem before? What happened then?\"      Denies antibiotic(s) use, use of different soaps, lotions, creams, or hot tub/swimsuit use. Denies antibiotic(s) use.     7. OTHER SYMPTOMS: \"Do you have any other symptoms?\" (e.g., fever, itching, vaginal bleeding, pain with urination, injury to genital area, vaginal foreign body)      See notes   8. PREGNANCY: \"Is there any chance you are pregnant?\" \"When was your last menstrual period?\"      Denies    Protocols used: Vaginal Symptoms-ADULT-[place]    "

## 2024-05-16 NOTE — TELEPHONE ENCOUNTER
Regarding: Foul odor/discharge  ----- Message from Rhona COLORADO sent at 5/16/2024  5:32 PM EDT -----  Patient called, has a foul odor and discharge.

## 2024-05-20 ENCOUNTER — OFFICE VISIT (OUTPATIENT)
Dept: OBGYN CLINIC | Facility: CLINIC | Age: 39
End: 2024-05-20
Payer: COMMERCIAL

## 2024-05-20 VITALS
SYSTOLIC BLOOD PRESSURE: 100 MMHG | HEIGHT: 60 IN | WEIGHT: 120 LBS | BODY MASS INDEX: 23.56 KG/M2 | DIASTOLIC BLOOD PRESSURE: 60 MMHG

## 2024-05-20 DIAGNOSIS — N89.8 VAGINAL DISCHARGE: Primary | ICD-10-CM

## 2024-05-20 DIAGNOSIS — N89.8 VAGINAL ODOR: ICD-10-CM

## 2024-05-20 DIAGNOSIS — Z11.3 SCREEN FOR STD (SEXUALLY TRANSMITTED DISEASE): ICD-10-CM

## 2024-05-20 PROCEDURE — 99213 OFFICE O/P EST LOW 20 MIN: CPT | Performed by: PHYSICIAN ASSISTANT

## 2024-05-20 PROCEDURE — 87591 N.GONORRHOEAE DNA AMP PROB: CPT | Performed by: PHYSICIAN ASSISTANT

## 2024-05-20 PROCEDURE — 87660 TRICHOMONAS VAGIN DIR PROBE: CPT | Performed by: PHYSICIAN ASSISTANT

## 2024-05-20 PROCEDURE — 87491 CHLMYD TRACH DNA AMP PROBE: CPT | Performed by: PHYSICIAN ASSISTANT

## 2024-05-20 PROCEDURE — 87480 CANDIDA DNA DIR PROBE: CPT | Performed by: PHYSICIAN ASSISTANT

## 2024-05-20 PROCEDURE — 87510 GARDNER VAG DNA DIR PROBE: CPT | Performed by: PHYSICIAN ASSISTANT

## 2024-05-20 RX ORDER — METRONIDAZOLE 500 MG/1
500 TABLET ORAL EVERY 12 HOURS SCHEDULED
Qty: 14 TABLET | Refills: 0 | Status: SHIPPED | OUTPATIENT
Start: 2024-05-20 | End: 2024-05-27

## 2024-05-20 NOTE — PROGRESS NOTES
Assessment/Plan:      Diagnoses and all orders for this visit:    Vaginal discharge  -     metroNIDAZOLE (FLAGYL) 500 mg tablet; Take 1 tablet (500 mg total) by mouth every 12 (twelve) hours for 7 days NO ALCOHOL WITH MEDICATION.  -     VAGINOSIS DNA PROBE    Vaginal odor  -     VAGINOSIS DNA PROBE    Screen for STD (sexually transmitted disease)  -     Chlamydia/GC amplified DNA by PCR      37y/o F here today for vaginal sxs as in HPI.  Likely BV based on sxs and exam findings.  Will start flagyl treatment PO - sent to pharmacy.  She states she has been having increased discharge for a while, needing to wear a panty liner - possible pH imbalance vs lengthy BV infection.  Vaginosis probe, GC/CT collected today - will call pt w/ results and f/u at that time. Can trial boric acid if needed.    Pt to [facility]TO annual GYN  exam, PAP smear.  Pt brought up intermittent pelvic pain x years - had pelvic US done 2021 relatively normal. Had neuro and GI workup as well.  Recommend pt keep a journal of sxs and can discuss further at annual.    Chief Complaint   Patient presents with    Vaginal Discharge     With odor           Subjective:     Patient ID: Maritza Macdonald is a 38 y.o. female.    37y/o F here today for vaginal sxs x 1.5 weeks.  She denies vaginal pain or itching.  States white creamy discharge w/ odor.  She always has to wear a panty liner.   Denies abnormal bleeding, new pelvic pain or urinary sxs.         Review of Systems   Constitutional: Negative.    Gastrointestinal:  Negative for abdominal distention, abdominal pain, constipation and diarrhea.   Genitourinary:  Positive for menstrual problem (dysmenorrhea) and vaginal discharge. Negative for dysuria, flank pain, frequency, hematuria, pelvic pain, urgency, vaginal bleeding and vaginal pain.         The following portions of the patient's history were reviewed and updated as appropriate: allergies, current medications, past family history, past medical history,  past social history, past surgical history, and problem list.      Objective:     Physical Exam  Vitals reviewed.   Constitutional:       Appearance: Normal appearance. She is not ill-appearing.   Abdominal:      Tenderness: There is no abdominal tenderness.   Genitourinary:     General: Normal vulva.      Labia:         Right: No rash, tenderness or lesion.         Left: No rash, tenderness or lesion.       Vagina: Vaginal discharge (odorous, creamy white) present. No erythema, tenderness, bleeding or lesions.      Cervix: Normal. No cervical motion tenderness, discharge, friability, lesion or erythema.   Lymphadenopathy:      Lower Body: No right inguinal adenopathy. No left inguinal adenopathy.   Neurological:      Mental Status: She is alert and oriented to person, place, and time.   Psychiatric:         Mood and Affect: Mood normal.         Behavior: Behavior normal. Behavior is cooperative.

## 2024-05-21 ENCOUNTER — TELEPHONE (OUTPATIENT)
Age: 39
End: 2024-05-21

## 2024-05-21 DIAGNOSIS — A74.9 CHLAMYDIA: Primary | ICD-10-CM

## 2024-05-21 LAB
C TRACH DNA SPEC QL NAA+PROBE: POSITIVE
N GONORRHOEA DNA SPEC QL NAA+PROBE: NEGATIVE

## 2024-05-21 RX ORDER — DOXYCYCLINE 100 MG/1
100 TABLET ORAL 2 TIMES DAILY
Qty: 14 TABLET | Refills: 0 | Status: SHIPPED | OUTPATIENT
Start: 2024-05-21 | End: 2024-05-28

## 2024-05-21 NOTE — TELEPHONE ENCOUNTER
Incoming call from patient advising script not received from pharmacy for Flagyl. Called [facility] pharmacy and verbal order given per order in chart. Called patient back to advise. Patient verbalized understanding. No further questions at this time.     metroNIDAZOLE (FLAGYL) 500 mg tablet [[id number]]    Order Details  Dose: 500 mg Route: Oral Frequency: Every 12 hours scheduled  Dispense Quantity: 14 tablet Refills: 0        Sig: Take 1 tablet (500 mg total) by mouth every 12 (twelve) hours for 7 days NO ALCOHOL WITH MEDICATION.    Minerva Jordan PA-C  NPI: [id number]

## 2024-05-21 NOTE — TELEPHONE ENCOUNTER
Pt called stating pharmacy still does not have this prescription. Call to pharmacy and verified with pharmacist that they do have the prescription for the doxycyline but not the flagyl. Verbal provided and he confirmed both prescriptions.

## 2024-05-22 LAB
CANDIDA RRNA VAG QL PROBE: NOT DETECTED
G VAGINALIS RRNA GENITAL QL PROBE: DETECTED
T VAGINALIS RRNA GENITAL QL PROBE: NOT DETECTED

## 2024-05-27 ENCOUNTER — PATIENT MESSAGE (OUTPATIENT)
Dept: OBGYN CLINIC | Facility: CLINIC | Age: 39
End: 2024-05-27

## 2024-05-28 ENCOUNTER — TELEPHONE (OUTPATIENT)
Dept: OBGYN CLINIC | Facility: CLINIC | Age: 39
End: 2024-05-28

## 2024-05-28 NOTE — TELEPHONE ENCOUNTER
I called and spoke to patient regarding her questions and concerns.  She states her partner tested negative for chlamydia and is questioning how she can test positive.  I discussed with patient presented to false positive versus false negative testing.  She states that her partner did have chlamydia approximately 2018 and was not tested at that time.  She did have negative chlamydia and gonorrhea testing in 2022 which is reassuring.  Since her partner tested negative recently, reportedly, difficult to tell if her test is false positive or his was false negative or if he was previously positive and treated and repeat testing was negative.  Either way I would recommend treating her as if she was positive and she is to complete doxycycline.  She reports he was given an injection in the hospital as well as oral medication she is taking and I encouraged that she have him still take the medication just to be safe.  She will complete doxycycline course and also start and complete Flagyl for the bacterial vaginosis.  She will let me know if any concerns otherwise she will return to office as we discussed in August.  I will remind medical assistant to add her to my schedule for time discussed.

## 2024-05-28 NOTE — PATIENT COMMUNICATION
Pt called in to speak with Minerva Jordan PA-C. Attempted warm transfer but was told Minerva was not available at the moment. Pt aware she will get a call back, states anytime is fine.

## 2024-06-19 ENCOUNTER — OFFICE VISIT (OUTPATIENT)
Dept: FAMILY MEDICINE CLINIC | Facility: CLINIC | Age: 39
End: 2024-06-19
Payer: COMMERCIAL

## 2024-06-19 VITALS
SYSTOLIC BLOOD PRESSURE: 112 MMHG | HEART RATE: 80 BPM | HEIGHT: 60 IN | OXYGEN SATURATION: 99 % | WEIGHT: 119 LBS | DIASTOLIC BLOOD PRESSURE: 76 MMHG | BODY MASS INDEX: 23.36 KG/M2

## 2024-06-19 DIAGNOSIS — M54.50 CHRONIC BILATERAL LOW BACK PAIN WITHOUT SCIATICA: ICD-10-CM

## 2024-06-19 DIAGNOSIS — G89.29 CHRONIC BILATERAL LOW BACK PAIN WITHOUT SCIATICA: ICD-10-CM

## 2024-06-19 DIAGNOSIS — R73.03 PREDIABETES: ICD-10-CM

## 2024-06-19 DIAGNOSIS — Z00.00 ANNUAL PHYSICAL EXAM: Primary | ICD-10-CM

## 2024-06-19 DIAGNOSIS — Z12.31 SCREENING MAMMOGRAM FOR BREAST CANCER: ICD-10-CM

## 2024-06-19 PROCEDURE — 99386 PREV VISIT NEW AGE 40-64: CPT | Performed by: FAMILY MEDICINE

## 2024-06-19 NOTE — ASSESSMENT & PLAN NOTE
Patient has a history of prediabetes with an A1c of 5.7, patient advised low-carb diet/exercise.  Repeat A1c to reassess glycemic control.

## 2024-06-19 NOTE — PROGRESS NOTES
Adult Annual Physical  Name: Maritza Macdonald      : 1985      MRN: [id number]  Encounter Provider: Marni Turner MD  Encounter Date: 2024   Encounter department: [facility]    Assessment & Plan   1. Annual physical exam  Assessment & Plan:  Patient is due for breast cancer screening.  Up-to-date on cervical cancer screening.  2. Chronic bilateral low back pain without sciatica  Assessment & Plan:  Regarding symptoms, related to poor posture.  Previously treated with muscle relaxer which helped.  Patient does not have any neurological red flags on history or exam today.  Recommended postural changes, warm compresses, gentle range of motion.  Physical therapy if symptoms do not improve.  Orders:  -     Ambulatory Referral to Physical Therapy; Future  3. Screening mammogram for breast cancer  -     Mammo screening bilateral w 3d & cad; Future  4. Prediabetes  Assessment & Plan:  Patient has a history of prediabetes with an A1c of 5.7, patient advised low-carb diet/exercise.  Repeat A1c to reassess glycemic control.  Orders:  -     Comprehensive metabolic panel  -     Hemoglobin A1C    Immunizations and preventive care screenings were discussed with patient today. Appropriate education was printed on patient's after visit summary.    Counseling:  Exercise: the importance of regular exercise/physical activity was discussed. Recommend exercise 3-5 times per week for at least 30 minutes.       Depression Screening and Follow-up Plan: Patient was screened for depression during today's encounter. They screened negative with a PHQ-2 score of 0.        History of Present Illness     Patient is here to establish care.  Reports symptoms of intermittent episodes of low back pain.  Symptoms are mostly on both sides.  Reports poor posture specially at work.  Previously evaluated at urgent care and treated with muscle relaxers which helped.  Metabolic labs and chart reviewed today.  Patient  noted to have an A1c of 5.7 last year, lipid panel was normal.  Patient tries to eat healthy and exercise regularly.        Adult Annual Physical:  Patient presents for annual physical.     Diet and Physical Activity:  - Diet/Nutrition: well balanced diet.  - Exercise: walking.    Depression Screening:  - PHQ-2 Score: 0    General Health:  - Sleep: sleeps well.  - Hearing: normal hearing bilateral ears.  - Vision: no vision problems.  - Dental: regular dental visits.    Review of Systems   Constitutional: Negative.    Respiratory: Negative.     Cardiovascular: Negative.    Musculoskeletal:  Positive for back pain.     No current outpatient medications on file prior to visit.     No current facility-administered medications on file prior to visit.        Objective     /76   Pulse 80   Ht 5' (1.524 m)   Wt 54 kg (119 lb)   LMP 04/30/2024   SpO2 99%   BMI 23.24 kg/m²     Physical Exam  Constitutional:       Appearance: Normal appearance.   HENT:      Right Ear: Tympanic membrane normal.      Left Ear: Tympanic membrane normal.      Mouth/Throat:      Pharynx: No posterior oropharyngeal erythema.   Eyes:      Pupils: Pupils are equal, round, and reactive to light.   Cardiovascular:      Heart sounds: Normal heart sounds.   Pulmonary:      Breath sounds: Normal breath sounds.   Abdominal:      Palpations: Abdomen is soft.   Musculoskeletal:      Right lower leg: No edema.      Left lower leg: No edema.      Comments: No spinal tenderness.  Patient reports bilateral paravertebral lumbar muscle spasm.   Neurological:      Mental Status: She is oriented to person, place, and time.   Psychiatric:         Mood and Affect: Mood normal.       Administrative Statements

## 2024-06-19 NOTE — ASSESSMENT & PLAN NOTE
Regarding symptoms, related to poor posture.  Previously treated with muscle relaxer which helped.  Patient does not have any neurological red flags on history or exam today.  Recommended postural changes, warm compresses, gentle range of motion.  Physical therapy if symptoms do not improve.